# Patient Record
Sex: FEMALE | Race: WHITE | NOT HISPANIC OR LATINO | Employment: UNEMPLOYED | URBAN - NONMETROPOLITAN AREA
[De-identification: names, ages, dates, MRNs, and addresses within clinical notes are randomized per-mention and may not be internally consistent; named-entity substitution may affect disease eponyms.]

---

## 2017-01-04 ENCOUNTER — APPOINTMENT (OUTPATIENT)
Dept: PREADMISSION TESTING | Facility: HOSPITAL | Age: 52
End: 2017-01-04

## 2017-01-04 LAB
ALBUMIN SERPL-MCNC: 4.2 G/DL (ref 3.5–5)
ALBUMIN/GLOB SERPL: 1.3 G/DL (ref 1.5–2.5)
ALP SERPL-CCNC: 61 U/L (ref 46–116)
ALT SERPL W P-5'-P-CCNC: 14 U/L (ref 10–36)
ANION GAP SERPL CALCULATED.3IONS-SCNC: 5.9 MMOL/L (ref 3.6–11.2)
AST SERPL-CCNC: 23 U/L (ref 10–30)
BASOPHILS # BLD AUTO: 0.04 10*3/MM3 (ref 0–0.3)
BASOPHILS NFR BLD AUTO: 0.4 % (ref 0–2)
BILIRUB SERPL-MCNC: 0.4 MG/DL (ref 0.2–1.8)
BUN BLD-MCNC: 21 MG/DL (ref 7–21)
BUN/CREAT SERPL: 23.1 (ref 7–25)
CALCIUM SPEC-SCNC: 9.8 MG/DL (ref 7.7–10)
CHLORIDE SERPL-SCNC: 107 MMOL/L (ref 99–112)
CO2 SERPL-SCNC: 29.1 MMOL/L (ref 24.3–31.9)
CREAT BLD-MCNC: 0.91 MG/DL (ref 0.43–1.29)
DEPRECATED RDW RBC AUTO: 41.1 FL (ref 37–54)
EOSINOPHIL # BLD AUTO: 0.36 10*3/MM3 (ref 0–0.7)
EOSINOPHIL NFR BLD AUTO: 3.8 % (ref 0–5)
ERYTHROCYTE [DISTWIDTH] IN BLOOD BY AUTOMATED COUNT: 13.2 % (ref 11.5–14.5)
GFR SERPL CREATININE-BSD FRML MDRD: 65 ML/MIN/1.73
GLOBULIN UR ELPH-MCNC: 3.2 GM/DL
GLUCOSE BLD-MCNC: 99 MG/DL (ref 70–110)
HCT VFR BLD AUTO: 40.6 % (ref 37–47)
HGB BLD-MCNC: 13.1 G/DL (ref 12–16)
IMM GRANULOCYTES # BLD: 0.02 10*3/MM3 (ref 0–0.03)
IMM GRANULOCYTES NFR BLD: 0.2 % (ref 0–0.5)
LYMPHOCYTES # BLD AUTO: 3.17 10*3/MM3 (ref 1–3)
LYMPHOCYTES NFR BLD AUTO: 33.8 % (ref 21–51)
MCH RBC QN AUTO: 28.1 PG (ref 27–33)
MCHC RBC AUTO-ENTMCNC: 32.3 G/DL (ref 33–37)
MCV RBC AUTO: 86.9 FL (ref 80–94)
MONOCYTES # BLD AUTO: 0.49 10*3/MM3 (ref 0.1–0.9)
MONOCYTES NFR BLD AUTO: 5.2 % (ref 0–10)
NEUTROPHILS # BLD AUTO: 5.3 10*3/MM3 (ref 1.4–6.5)
NEUTROPHILS NFR BLD AUTO: 56.6 % (ref 30–70)
OSMOLALITY SERPL CALC.SUM OF ELEC: 286.1 MOSM/KG (ref 273–305)
PLATELET # BLD AUTO: 339 10*3/MM3 (ref 130–400)
PMV BLD AUTO: 9.9 FL (ref 6–10)
POTASSIUM BLD-SCNC: 3.9 MMOL/L (ref 3.5–5.3)
PROT SERPL-MCNC: 7.4 G/DL (ref 6–8)
RBC # BLD AUTO: 4.67 10*6/MM3 (ref 4.2–5.4)
SODIUM BLD-SCNC: 142 MMOL/L (ref 135–153)
WBC NRBC COR # BLD: 9.38 10*3/MM3 (ref 4.5–12.5)

## 2017-01-04 PROCEDURE — 80053 COMPREHEN METABOLIC PANEL: CPT | Performed by: SURGERY

## 2017-01-04 PROCEDURE — 36415 COLL VENOUS BLD VENIPUNCTURE: CPT

## 2017-01-04 PROCEDURE — 85025 COMPLETE CBC W/AUTO DIFF WBC: CPT | Performed by: SURGERY

## 2017-01-04 NOTE — DISCHARGE INSTRUCTIONS
1/6/17@ 0700  TAKE the following medications the morning of surgery:  All heart or blood pressure medications    HOLD all diabetic medications the morning of surgery as ordered by physician.      General Instructions:  · Do not eat or drink after midnight: includes water, mints, or gum. You may brush your teeth.  · Do not smoke, chew tobacco, or drink alcohol.  · Bring medications in original bottles, any inhalers and if applicable your C-PAP/BI-PAP machine.  · Bring any papers given to you in the doctor's office.  · Wear clean comfortable clothes and socks.  · Do not wear contact lenses or make-up. Bring a case for your glasses if applicable.  · Bring crutches or walker if applicable.  · Leave all other valuables and jewelry at home.    If you were given a blood bank ID arm band remember to bring it with you the day of surgery.    Preventing a Surgical Site Infection:  Shower on the morning of surgery using a fresh bar of anti-bacterial soap (such as Dial) and clean washcloth. Dry with a clean towel and dress in clean clothing.  For 2 to 3 days before surgery, avoid shaving with a razor near where you will have surgery because the razor can irritate skin and make it easier to develop an infection. Ask your surgeon if you will be receiving antibiotics prior to surgery.  Make sure you, your family, and all healthcare providers clear their hands with soap and water or an alcohol-based hand  before caring for you or your wound.  If at all possible, quit smoking as many days before surgery as you can.    Day of surgery:  Upon arrival, a Pre-op nurse and Anesthesiologist will review your health history, obtain vital signs, and answer questions you may have. The only belongings needed at this time will be your home medications and if applicable your C-PAP/BI-PAP machine. If you are staying overnight your family can leave the rest of your belongings in the car and bring them to your room later. A Pre-op nurse will  start an IV and you may receive medication in preparation for surgery, including something to help you relax. Your family will be able to see you in the Pre-op area. While you are in surgery your family should notify the waiting room  if they leave the waiting room area and provide a contact phone number.    Please be aware that surgery does come with discomfort. We want to make every effort to control your discomfort so please discuss any uncontrolled symptoms with your nurse. Your doctor will most likely have prescribed pain medications.    If you are going home after surgery you will receive individualized written care instructions before being discharged. A responsible adult must drive you to and from the hospital on the day of surgery and stay with you for 24 hours.    If you are staying overnight following surgery, you will be transported to your hospital room following the recovery period.  [facility] has all private rooms.    If you have any questions please call Pre-Admission Testing at [phone].  Deductibles and co-payments are collected on the day of service. Please be prepared to pay the required co-pay, deductible or deposit on the day of service as defined by your plan.

## 2017-01-04 NOTE — MR AVS SNAPSHOT
Carine Franklin   1/4/2017 1:45 PM   Appointment    Provider:  PAT 3 COR   [facility]   Dept Phone:  [phone]                Your Full Care Plan              Your Updated Medication List          This list is accurate as of: 1/4/17  1:54 PM.  Always use your most recent med list.                lisinopril 20 MG tablet   Commonly known as:  PRINIVIL,ZESTRIL       PREVACID 30 MG capsule   Generic drug:  lansoprazole               MyChart Signup     Our records indicate that you have declined [facility]. If you would like to sign up for [employer], please email [email or web address] or call [phone] to obtain an activation code.             Other Info from Your Visit           Allergies     No Known Allergies      Vital Signs     Smoking Status                   Former Smoker             Discharge Instructions       1/6/17@ 0700  TAKE the following medications the morning of surgery:  All heart or blood pressure medications    HOLD all diabetic medications the morning of surgery as ordered by physician.      General Instructions:  · Do not eat or drink after midnight: includes water, mints, or gum. You may brush your teeth.  · Do not smoke, chew tobacco, or drink alcohol.  · Bring medications in original bottles, any inhalers and if applicable your C-PAP/BI-PAP machine.  · Bring any papers given to you in the doctor's office.  · Wear clean comfortable clothes and socks.  · Do not wear contact lenses or make-up. Bring a case for your glasses if applicable.  · Bring crutches or walker if applicable.  · Leave all other valuables and jewelry at home.    If you were given a blood bank ID arm band remember to bring it with you the day of surgery.    Preventing a Surgical Site Infection:  Shower on the morning of surgery using a fresh bar of anti-bacterial soap (such as Dial) and clean washcloth. Dry with a clean towel and  dress in clean clothing.  For 2 to 3 days before surgery, avoid shaving with a razor near where you will have surgery because the razor can irritate skin and make it easier to develop an infection. Ask your surgeon if you will be receiving antibiotics prior to surgery.  Make sure you, your family, and all healthcare providers clear their hands with soap and water or an alcohol-based hand  before caring for you or your wound.  If at all possible, quit smoking as many days before surgery as you can.    Day of surgery:  Upon arrival, a Pre-op nurse and Anesthesiologist will review your health history, obtain vital signs, and answer questions you may have. The only belongings needed at this time will be your home medications and if applicable your C-PAP/BI-PAP machine. If you are staying overnight your family can leave the rest of your belongings in the car and bring them to your room later. A Pre-op nurse will start an IV and you may receive medication in preparation for surgery, including something to help you relax. Your family will be able to see you in the Pre-op area. While you are in surgery your family should notify the waiting room  if they leave the waiting room area and provide a contact phone number.    Please be aware that surgery does come with discomfort. We want to make every effort to control your discomfort so please discuss any uncontrolled symptoms with your nurse. Your doctor will most likely have prescribed pain medications.    If you are going home after surgery you will receive individualized written care instructions before being discharged. A responsible adult must drive you to and from the hospital on the day of surgery and stay with you for 24 hours.    If you are staying overnight following surgery, you will be transported to your hospital room following the recovery period.  [facility] has all private rooms.    If you have any questions please call  Pre-Admission Testing at [phone].  Deductibles and co-payments are collected on the day of service. Please be prepared to pay the required co-pay, deductible or deposit on the day of service as defined by your plan.           SYMPTOMS OF A STROKE    Call 911 or have someone take you to the Emergency Department if you have any of the following:    · Sudden numbness or weakness of your face, arm or leg especially on one side of the body  · Sudden confusion, diffiiculty speaking or trouble understanding   · Changes in your vision or loss of sight in one eye  · Sudden severe headache with no known cause  · sudden dizziness, trouble walking, loss of balance or coordination    It is important to seek emergency care right away if you have further stroke symptoms. If you get emergency help quickly, the powerful clot-dissolving medicines can reduce the disabilities caused by a stroke.     For more information:    American Stroke Association  1-158-7-STROKE  www.strokeassociation.org           IF YOU SMOKE OR USE TOBACCO PLEASE READ THE FOLLOWING:    Why is smoking bad for me?  Smoking increases the risk of heart disease, lung disease, vascular disease, stroke, and cancer.     If you smoke, STOP!    If you would like more information on quitting smoking, please visit the [employer] website: [email or web address]   This link will provide additional resources including the QUIT line and the [employer] support groups.     For more information:    American Cancer Society  ([phone] 832-7847    American Heart Association  9-307-734-4362

## 2017-01-20 ENCOUNTER — TELEPHONE (OUTPATIENT)
Dept: SURGERY | Facility: CLINIC | Age: 52
End: 2017-01-20

## 2017-01-20 ENCOUNTER — HOSPITAL ENCOUNTER (EMERGENCY)
Facility: HOSPITAL | Age: 52
Discharge: HOME | End: 2017-01-20
Attending: FAMILY MEDICINE | Admitting: FAMILY MEDICINE

## 2017-01-20 ENCOUNTER — ANESTHESIA (OUTPATIENT)
Dept: PERIOP | Facility: HOSPITAL | Age: 52
End: 2017-01-20

## 2017-01-20 ENCOUNTER — ANESTHESIA EVENT (OUTPATIENT)
Dept: PERIOP | Facility: HOSPITAL | Age: 52
End: 2017-01-20

## 2017-01-20 VITALS
HEIGHT: 66 IN | DIASTOLIC BLOOD PRESSURE: 76 MMHG | TEMPERATURE: 98.4 F | OXYGEN SATURATION: 100 % | BODY MASS INDEX: 40.34 KG/M2 | WEIGHT: 251 LBS | HEART RATE: 91 BPM | RESPIRATION RATE: 18 BRPM | SYSTOLIC BLOOD PRESSURE: 124 MMHG

## 2017-01-20 DIAGNOSIS — Z48.89 ENCOUNTER FOR POST SURGICAL WOUND CHECK: Primary | ICD-10-CM

## 2017-01-20 PROCEDURE — 99283 EMERGENCY DEPT VISIT LOW MDM: CPT

## 2017-01-20 PROCEDURE — 25010000002 MIDAZOLAM PER 1 MG: Performed by: NURSE ANESTHETIST, CERTIFIED REGISTERED

## 2017-01-20 PROCEDURE — 25010000002 DEXAMETHASONE PER 1 MG: Performed by: NURSE ANESTHETIST, CERTIFIED REGISTERED

## 2017-01-20 PROCEDURE — 25010000002 KETOROLAC TROMETHAMINE PER 15 MG: Performed by: NURSE ANESTHETIST, CERTIFIED REGISTERED

## 2017-01-20 PROCEDURE — 25010000002 PROPOFOL 10 MG/ML EMULSION: Performed by: NURSE ANESTHETIST, CERTIFIED REGISTERED

## 2017-01-20 PROCEDURE — 25010000002 ONDANSETRON PER 1 MG: Performed by: NURSE ANESTHETIST, CERTIFIED REGISTERED

## 2017-01-20 PROCEDURE — 25010000002 FENTANYL CITRATE (PF) 100 MCG/2ML SOLUTION: Performed by: NURSE ANESTHETIST, CERTIFIED REGISTERED

## 2017-01-20 RX ORDER — KETOROLAC TROMETHAMINE 30 MG/ML
INJECTION, SOLUTION INTRAMUSCULAR; INTRAVENOUS AS NEEDED
Status: DISCONTINUED | OUTPATIENT
Start: 2017-01-20 | End: 2017-01-20 | Stop reason: SURG

## 2017-01-20 RX ORDER — FAMOTIDINE 10 MG/ML
INJECTION, SOLUTION INTRAVENOUS AS NEEDED
Status: DISCONTINUED | OUTPATIENT
Start: 2017-01-20 | End: 2017-01-20 | Stop reason: SURG

## 2017-01-20 RX ORDER — MIDAZOLAM HYDROCHLORIDE 1 MG/ML
INJECTION INTRAMUSCULAR; INTRAVENOUS AS NEEDED
Status: DISCONTINUED | OUTPATIENT
Start: 2017-01-20 | End: 2017-01-20 | Stop reason: SURG

## 2017-01-20 RX ORDER — LIDOCAINE HYDROCHLORIDE 20 MG/ML
INJECTION, SOLUTION INFILTRATION; PERINEURAL AS NEEDED
Status: DISCONTINUED | OUTPATIENT
Start: 2017-01-20 | End: 2017-01-20 | Stop reason: SURG

## 2017-01-20 RX ORDER — FENTANYL CITRATE 50 UG/ML
INJECTION, SOLUTION INTRAMUSCULAR; INTRAVENOUS AS NEEDED
Status: DISCONTINUED | OUTPATIENT
Start: 2017-01-20 | End: 2017-01-20 | Stop reason: SURG

## 2017-01-20 RX ORDER — ROCURONIUM BROMIDE 10 MG/ML
INJECTION, SOLUTION INTRAVENOUS AS NEEDED
Status: DISCONTINUED | OUTPATIENT
Start: 2017-01-20 | End: 2017-01-20 | Stop reason: SURG

## 2017-01-20 RX ORDER — PROPOFOL 10 MG/ML
VIAL (ML) INTRAVENOUS AS NEEDED
Status: DISCONTINUED | OUTPATIENT
Start: 2017-01-20 | End: 2017-01-20 | Stop reason: SURG

## 2017-01-20 RX ORDER — DEXAMETHASONE SODIUM PHOSPHATE 10 MG/ML
INJECTION INTRAMUSCULAR; INTRAVENOUS AS NEEDED
Status: DISCONTINUED | OUTPATIENT
Start: 2017-01-20 | End: 2017-01-20 | Stop reason: SURG

## 2017-01-20 RX ORDER — ONDANSETRON 2 MG/ML
INJECTION INTRAMUSCULAR; INTRAVENOUS AS NEEDED
Status: DISCONTINUED | OUTPATIENT
Start: 2017-01-20 | End: 2017-01-20 | Stop reason: SURG

## 2017-01-20 RX ADMIN — MIDAZOLAM HYDROCHLORIDE 2 MG: 1 INJECTION, SOLUTION INTRAMUSCULAR; INTRAVENOUS at 08:41

## 2017-01-20 RX ADMIN — AMPICILLIN SODIUM AND SULBACTAM SODIUM 3 G: 2; 1 INJECTION, POWDER, FOR SOLUTION INTRAMUSCULAR; INTRAVENOUS at 08:41

## 2017-01-20 RX ADMIN — ROCURONIUM BROMIDE 20 MG: 10 INJECTION INTRAVENOUS at 08:47

## 2017-01-20 RX ADMIN — FAMOTIDINE 20 MG: 10 INJECTION, SOLUTION INTRAVENOUS at 08:41

## 2017-01-20 RX ADMIN — ONDANSETRON 4 MG: 2 INJECTION, SOLUTION INTRAMUSCULAR; INTRAVENOUS at 08:41

## 2017-01-20 RX ADMIN — KETOROLAC TROMETHAMINE 30 MG: 30 INJECTION, SOLUTION INTRAMUSCULAR; INTRAVENOUS at 09:15

## 2017-01-20 RX ADMIN — LIDOCAINE HYDROCHLORIDE 100 MG: 20 INJECTION, SOLUTION INFILTRATION; PERINEURAL at 08:47

## 2017-01-20 RX ADMIN — DEXAMETHASONE SODIUM PHOSPHATE 4 MG: 10 INJECTION INTRAMUSCULAR; INTRAVENOUS at 08:41

## 2017-01-20 RX ADMIN — SODIUM CHLORIDE, POTASSIUM CHLORIDE, SODIUM LACTATE AND CALCIUM CHLORIDE: 600; 310; 30; 20 INJECTION, SOLUTION INTRAVENOUS at 08:41

## 2017-01-20 RX ADMIN — FENTANYL CITRATE 100 MCG: 50 INJECTION INTRAMUSCULAR; INTRAVENOUS at 08:47

## 2017-01-20 RX ADMIN — PROPOFOL 200 MG: 10 INJECTION, EMULSION INTRAVENOUS at 08:47

## 2017-01-20 NOTE — ANESTHESIA POSTPROCEDURE EVALUATION
Patient: Carine Franklin    Procedure Summary     Date Anesthesia Start Anesthesia Stop Room / Location    01/20/17 0842 0933  [facility] OR 02 / [facility] OR       Procedure Diagnosis Surgeon Provider    CHOLECYSTECTOMY LAPAROSCOPIC (N/A Abdomen) Gallstones  (Gallstones [K80.20]) MD Nori Barron CRNA          Anesthesia Type: general  Last vitals  /74 (01/20/17 1011)    Temp 97.8 °F (36.6 °C) (01/20/17 1011)    Pulse 58 (01/20/17 1011)   Resp 16 (01/20/17 1011)    SpO2 98 % (01/20/17 1011)      Post Anesthesia Care and Evaluation    Patient location during evaluation: PHASE II  Patient participation: complete - patient participated  Level of consciousness: awake and alert  Pain score: 1  Pain management: adequate  Airway patency: patent  Anesthetic complications: No anesthetic complications  PONV Status: controlled  Cardiovascular status: acceptable  Respiratory status: acceptable  Hydration status: acceptable

## 2017-01-20 NOTE — ANESTHESIA PREPROCEDURE EVALUATION
Anesthesia Evaluation     Patient summary reviewed and Nursing notes reviewed    No history of anesthetic complications   Airway   Mallampati: II  TM distance: >3 FB  Neck ROM: full  no difficulty expected  Dental    (+) upper dentures and lower dentures    Pulmonary - negative pulmonary ROS and normal exam    breath sounds clear to auscultation  Cardiovascular - normal exam  (+) hypertension,     Rhythm: regular  Rate: normal    Neuro/Psych- negative ROS  GI/Hepatic/Renal/Endo    (+) obesity, morbid obesity (BMI 40kg/m2), GERD well controlled,     Musculoskeletal (-) negative ROS    Abdominal   (+) obese,     Bowel sounds: normal.   Substance History - negative use     OB/GYN negative ob/gyn ROS         Other                         Anesthesia Plan    ASA 3     general     intravenous induction   Anesthetic plan and risks discussed with patient.    Plan discussed with CRNA.

## 2017-01-20 NOTE — ANESTHESIA PROCEDURE NOTES
Airway  Airway not difficult    General Information and Staff    Patient location during procedure: OR  CRNA: [facility]    Indications and Patient Condition  Indications for airway management: airway protection    Preoxygenated: no  MILS maintained throughout  Mask difficulty assessment: 0 - not attempted    Final Airway Details  Final airway type: endotracheal airway      Successful airway: ETT  Cuffed: yes   Successful intubation technique: direct laryngoscopy  Endotracheal tube insertion site: oral  Blade: Cifuentes  Blade size: #2  ETT size: 7.5 mm  Cormack-Lehane Classification: grade I - full view of glottis  Placement verified by: chest auscultation and capnometry   Measured from: gums  ETT to gums (cm): 20  Number of attempts at approach: 1

## 2017-01-20 NOTE — ADDENDUM NOTE
Addendum  created 01/20/17 1054 by Gaurang Bush, DO    Anesthesia Attestations filed, Anesthesia Review and Sign - Ready for Procedure, Anesthesia Staff edited, Order sets accessed, Problem List reviewed, Sign clinical note

## 2017-01-20 NOTE — TELEPHONE ENCOUNTER
Patients daughter called and stated that the patient had gallbladder surgery this morning and when she stood up she started gushing blood around her navel, so I did speak with Dr. Mendez and he said for her to hold 2 fingers and apply pressure for 20 minutes and if that doesn't work then she needs to do it again for another 20 minutes and if it is still draining then she will need to go to the ER and she understood and agreed to do that.

## 2017-01-21 NOTE — ED PROVIDER NOTES
Subjective   Patient is a 52 y.o. female presenting with wound check.   History provided by:  Patient   used: No    Wound Check   Location:  Umbilical  Severity:  Mild  Onset quality:  Gradual  Timing:  Intermittent  Progression:  Waxing and waning  Chronicity:  New  Context:  Had laparoscopic cholecystectomy this morning, has bleeding from umbilical wound  Worsened by:  Movement  Ineffective treatments:  Pressure  Associated symptoms: no abdominal pain, no chest pain, no congestion, no fatigue, no fever, no headaches, no rash, no rhinorrhea, no shortness of breath and no sore throat        Review of Systems   Constitutional: Negative.  Negative for fatigue and fever.   HENT: Negative.  Negative for congestion, rhinorrhea and sore throat.    Respiratory: Negative.  Negative for shortness of breath.    Cardiovascular: Negative.  Negative for chest pain.   Gastrointestinal: Negative.  Negative for abdominal pain.   Endocrine: Negative.    Genitourinary: Negative.  Negative for dysuria.   Skin: Positive for wound. Negative for rash.   Neurological: Negative.  Negative for headaches.   Psychiatric/Behavioral: Negative.    All other systems reviewed and are negative.      Past Medical History   Diagnosis Date   • Acid reflux    • Gallstones    • Hypertension        Allergies   Allergen Reactions   • No Known Drug Allergy        Past Surgical History   Procedure Laterality Date   • Laparoscopic tubal ligation     • Laser ablation         Family History   Problem Relation Age of Onset   • Hypertension Mother    • Cancer Father    • Diabetes Sister    • Hypertension Sister    • Hypertension Brother    • Diabetes Sister    • Hypertension Sister    • Hypertension Brother    • Hypertension Sister    • Heart disease Neg Hx        Social History     Social History   • Marital status:      Spouse name: N/A   • Number of children: N/A   • Years of education: N/A     Social History Main Topics   • Smoking  status: Former Smoker     Years: 15.00   • Smokeless tobacco: Not on file      Comment: quit 26 years ago   • Alcohol use No   • Drug use: No   • Sexual activity: Defer     Other Topics Concern   • Not on file     Social History Narrative           Objective   Physical Exam   Constitutional: She is oriented to person, place, and time. She appears well-developed and well-nourished.   HENT:   Head: Normocephalic and atraumatic.   Nose: Nose normal.   Mouth/Throat: Oropharynx is clear and moist.   Eyes: EOM are normal. Pupils are equal, round, and reactive to light.   Neck: Normal range of motion.   Cardiovascular: Normal rate, regular rhythm, normal heart sounds and intact distal pulses.    Pulmonary/Chest: Effort normal and breath sounds normal.   Abdominal: Soft. Bowel sounds are normal.   Musculoskeletal: Normal range of motion.   Neurological: She is alert and oriented to person, place, and time.   Skin: Skin is warm and dry.   4 tiny surgical wounds on abdomen, 3 closed with very mild surrounding ecchymosis. Umbilical surgical wound <0.5 cm opening with mild bleeding.    Psychiatric: She has a normal mood and affect. Her behavior is normal. Judgment and thought content normal.   Nursing note and vitals reviewed.      Procedures         ED Course  ED Course   Comment By Time   Applied skin affix to umbilical wound and applied small piece of surgi-jorge l and applied dressing. Bleeding controlled now.  Sara Archer, NASRIN 01/20 2149                  [facility]  Number of Diagnoses or Management Options  Encounter for post surgical wound check: new and does not require workup  Risk of Complications, Morbidity, and/or Mortality  Presenting problems: moderate  Diagnostic procedures: moderate  Management options: moderate    Patient Progress  Patient progress: improved      Final diagnoses:   None            NASRIN Borden  01/20/17 2220

## 2017-01-31 ENCOUNTER — OFFICE VISIT (OUTPATIENT)
Dept: SURGERY | Facility: CLINIC | Age: 52
End: 2017-01-31

## 2017-01-31 VITALS
DIASTOLIC BLOOD PRESSURE: 88 MMHG | WEIGHT: 251 LBS | BODY MASS INDEX: 40.34 KG/M2 | HEIGHT: 66 IN | HEART RATE: 67 BPM | SYSTOLIC BLOOD PRESSURE: 123 MMHG

## 2017-01-31 DIAGNOSIS — K80.20 GALLSTONES: Primary | ICD-10-CM

## 2017-01-31 PROCEDURE — 99024 POSTOP FOLLOW-UP VISIT: CPT | Performed by: SURGERY

## 2017-01-31 NOTE — PROGRESS NOTES
Subjective   Carine Franklin is a 52 y.o. female  is here today for follow-up.         Carine Franklin is a 52 y.o. female here for followup after cholecystectomy.  The patient is doing well and tolerating diet.  Their incisions are healing well at this time.  She did return to the emergency department for some bleeding from the infraumbilical incision which was controlled with pressure.  No complaints reported.              Carine was seen today for post-op lap yasmine.    Diagnoses and all orders for this visit:    Gallstones        Assessment     52-year-old female doing well after cholecystectomy.  Pathology consistent with cholelithiasis and cholecystitis.  She will follow-up as needed.

## 2017-01-31 NOTE — MR AVS SNAPSHOT
"                        Carine Franklin   1/31/2017 9:30 AM   Office Visit    Dept Phone:  [phone]   Encounter #:  [id number]    Provider:  Manoj Mendez MD   Department:  [facility] GENERAL SURGERY                Your Full Care Plan              Your Updated Medication List          This list is accurate as of: 1/31/17 10:23 AM.  Always use your most recent med list.                HYDROcodone-acetaminophen 5-325 MG per tablet   Commonly known as:  NORCO   Take 1-2 tablets by mouth Every 4 (Four) Hours As Needed (Pain).       lisinopril 20 MG tablet   Commonly known as:  PRINIVIL,ZESTRIL       PREVACID 30 MG capsule   Generic drug:  lansoprazole               You Were Diagnosed With        Codes Comments    Gallstones    -  Primary ICD-10-CM: K80.20  ICD-9-CM: 574.20       Instructions     None    Patient Instructions History      Upcoming Appointments     Visit Type Date Time Department    POST-OP 1/31/2017  9:30 AM [facility]      MyChart Signup     Our records [facility] that [facility]. If you would like to sign up for SmartCare systemt, please email [email or web address] or call [phone] to obtain an activation code.             Other Info from Your Visit           Allergies     No Known Drug Allergy        Reason for Visit     POST-OP LAP ADALBERTO           Vital Signs     Blood Pressure Pulse Height Weight Body Mass Index Smoking Status    123/88 67 66\" (167.6 cm) 251 lb (114 kg) 40.51 kg/m2 Former Smoker      Problems and Diagnoses Noted     Gallstones        "

## 2017-01-31 NOTE — LETTER
January 31, 2017     Fly Pereyra MD  [address]  [facility] [address]    Patient: Carine Franklin   YOB: 1965   Date of Visit: 1/31/2017       Dear Dr. Roddy MD:    Thank you for referring Carine Franklin to me for evaluation. Below are the relevant portions of my assessment and plan of care.           52-year-old female doing well after cholecystectomy.  Pathology consistent with cholelithiasis and cholecystitis.  She will follow-up as needed.               If you have questions, please do not hesitate to call me. I look forward to following Carine along with you.         Sincerely,        Manoj Mendez MD        CC: No Recipients

## 2017-08-08 ENCOUNTER — OFFICE VISIT (OUTPATIENT)
Dept: FAMILY MEDICINE CLINIC | Facility: CLINIC | Age: 52
End: 2017-08-08

## 2017-08-08 VITALS
WEIGHT: 255 LBS | HEART RATE: 75 BPM | TEMPERATURE: 98.8 F | HEIGHT: 66 IN | DIASTOLIC BLOOD PRESSURE: 90 MMHG | SYSTOLIC BLOOD PRESSURE: 130 MMHG | BODY MASS INDEX: 40.98 KG/M2 | OXYGEN SATURATION: 97 %

## 2017-08-08 DIAGNOSIS — I10 ESSENTIAL HYPERTENSION: ICD-10-CM

## 2017-08-08 DIAGNOSIS — K21.9 GASTROESOPHAGEAL REFLUX DISEASE WITHOUT ESOPHAGITIS: ICD-10-CM

## 2017-08-08 DIAGNOSIS — M15.9 OSTEOARTHRITIS INVOLVING MULTIPLE JOINTS ON BOTH SIDES OF BODY: ICD-10-CM

## 2017-08-08 DIAGNOSIS — M15.9 OSTEOARTHRITIS OF MULTIPLE JOINTS, UNSPECIFIED OSTEOARTHRITIS TYPE: ICD-10-CM

## 2017-08-08 DIAGNOSIS — Z13.9 SCREENING: ICD-10-CM

## 2017-08-08 DIAGNOSIS — E66.01 MORBID OBESITY, UNSPECIFIED OBESITY TYPE (HCC): Primary | ICD-10-CM

## 2017-08-08 PROCEDURE — 99204 OFFICE O/P NEW MOD 45 MIN: CPT | Performed by: NURSE PRACTITIONER

## 2017-08-08 RX ORDER — PANTOPRAZOLE SODIUM 40 MG/1
40 TABLET, DELAYED RELEASE ORAL DAILY
COMMUNITY
End: 2017-08-08 | Stop reason: SDUPTHER

## 2017-08-08 RX ORDER — MELOXICAM 15 MG/1
15 TABLET ORAL DAILY
Qty: 30 TABLET | Refills: 5 | Status: SHIPPED | OUTPATIENT
Start: 2017-08-08 | End: 2018-02-24 | Stop reason: HOSPADM

## 2017-08-08 RX ORDER — HYDROCHLOROTHIAZIDE 12.5 MG/1
12.5 TABLET ORAL DAILY
COMMUNITY
End: 2017-08-08 | Stop reason: SDUPTHER

## 2017-08-08 RX ORDER — PANTOPRAZOLE SODIUM 40 MG/1
40 TABLET, DELAYED RELEASE ORAL DAILY
Qty: 30 TABLET | Refills: 5 | Status: SHIPPED | OUTPATIENT
Start: 2017-08-08 | End: 2017-09-07 | Stop reason: SINTOL

## 2017-08-08 RX ORDER — HYDROCHLOROTHIAZIDE 12.5 MG/1
12.5 TABLET ORAL DAILY
Qty: 30 TABLET | Refills: 5 | Status: SHIPPED | OUTPATIENT
Start: 2017-08-08 | End: 2017-08-14 | Stop reason: DRUGHIGH

## 2017-08-08 RX ORDER — MELOXICAM 7.5 MG/1
7.5 TABLET ORAL DAILY
COMMUNITY
End: 2017-08-08 | Stop reason: DRUGHIGH

## 2017-08-08 NOTE — PROGRESS NOTES
Subjective   Carine Franklin is a 52 y.o. female.     Chief Complaint   Patient presents with   • Establish care       History of Present Illness     Patient is here today to establish care she is a former patient of [facility].  Hypertension - patient checks her blood pressure randomly at home with readings reported within acceptable range.  She currently takes hydrochlorothiazide daily.  Gastroesophageal reflux disease - patient had an EGD approximately 2 years ago with findings of gastritis.  She is well controlled on her current dose of Protonix 40 mg 1 daily.  Osteoarthritis - patient has a history of osteoarthritis in her low back, knees, neck and shoulders.  She reports this pain is stiff and aching.  Pain is worse with increased activity.  She currently is controlled on Mobic 7.5 mg twice daily.  Her insurance will not cover this strength and she is requesting a change.  She denies any side effects.   Obesity - patient has struggled with obesity most of her adult life.  She recently lost approximately 60 pounds and was planning on having weight loss surgery.  During that time her mother became ill and passed away and Carine got off track on her diet and weight loss goals.  She has been walking and trying to eat a low carb heart healthy diet.  She would like to discuss options.    Family history of type 2 diabetes - she has 2 sisters with type 2 diabetes.      The following portions of the patient's history were reviewed and updated as appropriate: allergies, current medications, past family history, past medical history, past social history, past surgical history and problem list.    Review of Systems   Constitutional: Negative for activity change, appetite change, chills, fatigue and fever.   HENT: Negative for ear pain, facial swelling, hearing loss, sinus pressure, sore throat, trouble swallowing and voice change.    Eyes: Negative for pain, discharge and visual disturbance.   Respiratory:  "Negative for apnea, cough, chest tightness, shortness of breath and wheezing.    Cardiovascular: Negative for chest pain, palpitations and leg swelling.   Gastrointestinal: Negative for abdominal pain, blood in stool, constipation, diarrhea, nausea and vomiting.   Genitourinary: Negative for dysuria.   Musculoskeletal: Positive for arthralgias, back pain, neck pain and neck stiffness.   Skin: Negative for color change.   Neurological: Negative for headaches.   Psychiatric/Behavioral: Negative for confusion and suicidal ideas. The patient is not nervous/anxious.    All other systems reviewed and are negative.      Objective     /90 (BP Location: Left arm, Patient Position: Sitting, Cuff Size: Adult)  Pulse 75  Temp 98.8 °F (37.1 °C) (Tympanic)   Ht 66\" (167.6 cm)  Wt 255 lb (116 kg)  SpO2 97%  BMI 41.16 kg/m2      Physical Exam   Constitutional: She is oriented to person, place, and time. Vital signs are normal. She appears well-developed and well-nourished. No distress.   Visibly obese.    HENT:   Head: Normocephalic and atraumatic.   Right Ear: External ear normal.   Left Ear: External ear normal.   Nose: Nose normal.   Mouth/Throat: Oropharynx is clear and moist. No oropharyngeal exudate.   Eyes: EOM are normal. Pupils are equal, round, and reactive to light.   Neck: Normal range of motion. Neck supple. No tracheal deviation present. No thyromegaly present.   Cardiovascular: Normal rate, regular rhythm, normal heart sounds and intact distal pulses.  Exam reveals no gallop and no friction rub.    No murmur heard.  Pulmonary/Chest: Effort normal and breath sounds normal. No respiratory distress. She has no wheezes. She has no rales. She exhibits no tenderness.   Abdominal: Soft. Bowel sounds are normal. She exhibits no distension and no mass. There is no tenderness. There is no rebound and no guarding.   Musculoskeletal:        Right knee: She exhibits decreased range of motion.        Left knee: She " exhibits decreased range of motion.        Cervical back: She exhibits tenderness, pain and spasm.   General stiffness is noted in both shoulders.   Lymphadenopathy:     She has no cervical adenopathy.   Neurological: She is alert and oriented to person, place, and time. She has normal reflexes.   CN 2-12 grossly intact    Skin: Skin is warm and dry. No rash noted. She is not diaphoretic. No erythema.   Psychiatric: She has a normal mood and affect. Her speech is normal and behavior is normal. Judgment and thought content normal. Cognition and memory are normal.   Vitals reviewed.      Assessment/Plan     Problem List Items Addressed This Visit        Cardiovascular and Mediastinum    Essential hypertension    Relevant Medications    hydrochlorothiazide (HYDRODIURIL) 12.5 MG tablet    Other Relevant Orders    CBC & Differential    Comprehensive Metabolic Panel    TSH    Hemoglobin A1c    Vitamin D 25 Hydroxy    MicroAlbumin, Urine, Random    Lipid Panel    Vitamin B12       Digestive    Morbid obesity - Primary    Relevant Orders    Ambulatory Referral to Bariatric Surgery    Gastroesophageal reflux disease without esophagitis    Relevant Medications    pantoprazole (PROTONIX) 40 MG EC tablet       Musculoskeletal and Integument    Osteoarthritis of multiple joints      Other Visit Diagnoses     Osteoarthritis involving multiple joints on both sides of body        Relevant Orders    XR Spine Cervical 2 or 3 View    XR Shoulder 2+ View Left    XR Knee 1 or 2 View Bilateral    Screening        Relevant Orders    CBC & Differential    Comprehensive Metabolic Panel    TSH    Hemoglobin A1c    Vitamin D 25 Hydroxy    MicroAlbumin, Urine, Random    Lipid Panel    Vitamin B12      GERD precautions have been discussed.  Body mechanics have been reviewed.  Side effects of anti-inflammatory medication has been discussed.  We will increase Mobic to 15 mg daily.  Patient may half and take 7.5 mg twice daily if tolerated.  I  have discussed diagnosis in detail today allowing time for questions and answers. Pt is aware of reasons to seek urgent or emergent medical care as well as reasons to return to the clinic for evaluation. Possible side effects, interactions and progression of symptoms discussed as well. Pt / family states understanding.   We have discussed in detail options for weight loss surgery.  Patient will continue on a low fat heart healthy diet with increased protein and low carb intake.  Daily exercise is recommended such as walking or swimming.  Refill routine medications.  X-ray orders have been provided.  Referral to Dr. Pollock for evaluation.  Patient did have an EGD in 2015 in [place] which will be requested.  Follow-up in one month, sooner if needed.  Fasting labs have been ordered.               This document has been electronically signed by:  NASRIN Can, SONIAC

## 2017-08-14 RX ORDER — LISINOPRIL AND HYDROCHLOROTHIAZIDE 12.5; 1 MG/1; MG/1
TABLET ORAL
Qty: 15 TABLET | Refills: 3
Start: 2017-08-14 | End: 2018-02-24 | Stop reason: HOSPADM

## 2017-08-29 ENCOUNTER — HOSPITAL ENCOUNTER (OUTPATIENT)
Dept: GENERAL RADIOLOGY | Facility: HOSPITAL | Age: 52
Discharge: HOME | End: 2017-08-29
Admitting: NURSE PRACTITIONER

## 2017-08-29 ENCOUNTER — HOSPITAL ENCOUNTER (OUTPATIENT)
Dept: GENERAL RADIOLOGY | Facility: HOSPITAL | Age: 52
Discharge: HOME | End: 2017-08-29

## 2017-08-29 DIAGNOSIS — M77.9 BONE SPUR: ICD-10-CM

## 2017-08-29 DIAGNOSIS — M13.0 ARTHRITIS OF MULTIPLE SITES: Primary | ICD-10-CM

## 2017-08-29 DIAGNOSIS — M15.9 OSTEOARTHRITIS INVOLVING MULTIPLE JOINTS ON BOTH SIDES OF BODY: ICD-10-CM

## 2017-08-29 PROCEDURE — 72050 X-RAY EXAM NECK SPINE 4/5VWS: CPT | Performed by: RADIOLOGY

## 2017-08-29 PROCEDURE — 73560 X-RAY EXAM OF KNEE 1 OR 2: CPT | Performed by: RADIOLOGY

## 2017-08-29 PROCEDURE — 73030 X-RAY EXAM OF SHOULDER: CPT

## 2017-08-29 PROCEDURE — 72040 X-RAY EXAM NECK SPINE 2-3 VW: CPT

## 2017-08-29 PROCEDURE — 73560 X-RAY EXAM OF KNEE 1 OR 2: CPT

## 2017-08-29 PROCEDURE — 73030 X-RAY EXAM OF SHOULDER: CPT | Performed by: RADIOLOGY

## 2017-09-01 ENCOUNTER — TELEPHONE (OUTPATIENT)
Dept: FAMILY MEDICINE CLINIC | Facility: CLINIC | Age: 52
End: 2017-09-01

## 2017-09-01 NOTE — TELEPHONE ENCOUNTER
----- Message from NASRIN Atkins sent at 8/29/2017  1:10 PM EDT -----  Please let patient know that she appears to have a bone spur in her neck that may be causing her pain.  We will need to refer her for further evaluation.

## 2017-09-05 ENCOUNTER — LAB (OUTPATIENT)
Dept: FAMILY MEDICINE CLINIC | Facility: CLINIC | Age: 52
End: 2017-09-05

## 2017-09-05 DIAGNOSIS — Z13.9 SCREENING: ICD-10-CM

## 2017-09-05 DIAGNOSIS — I10 ESSENTIAL HYPERTENSION: ICD-10-CM

## 2017-09-05 LAB
25(OH)D3 SERPL-MCNC: 42 NG/ML
ALBUMIN SERPL-MCNC: 4.1 G/DL (ref 3.5–5)
ALBUMIN UR-MCNC: 2.6 MG/L
ALBUMIN/GLOB SERPL: 1.4 G/DL (ref 1.5–2.5)
ALP SERPL-CCNC: 56 U/L (ref 35–104)
ALT SERPL W P-5'-P-CCNC: 18 U/L (ref 10–36)
ANION GAP SERPL CALCULATED.3IONS-SCNC: 7.6 MMOL/L (ref 3.6–11.2)
AST SERPL-CCNC: 20 U/L (ref 10–30)
BASOPHILS # BLD AUTO: 0.02 10*3/MM3 (ref 0–0.3)
BASOPHILS NFR BLD AUTO: 0.3 % (ref 0–2)
BILIRUB SERPL-MCNC: 0.5 MG/DL (ref 0.2–1.8)
BUN BLD-MCNC: 23 MG/DL (ref 7–21)
BUN/CREAT SERPL: 29.1 (ref 7–25)
CALCIUM SPEC-SCNC: 9.7 MG/DL (ref 7.7–10)
CHLORIDE SERPL-SCNC: 109 MMOL/L (ref 99–112)
CHOLEST SERPL-MCNC: 187 MG/DL (ref 0–200)
CO2 SERPL-SCNC: 24.4 MMOL/L (ref 24.3–31.9)
CREAT BLD-MCNC: 0.79 MG/DL (ref 0.43–1.29)
DEPRECATED RDW RBC AUTO: 37.6 FL (ref 37–54)
EOSINOPHIL # BLD AUTO: 0.17 10*3/MM3 (ref 0–0.7)
EOSINOPHIL NFR BLD AUTO: 2.7 % (ref 0–5)
ERYTHROCYTE [DISTWIDTH] IN BLOOD BY AUTOMATED COUNT: 12.5 % (ref 11.5–14.5)
GFR SERPL CREATININE-BSD FRML MDRD: 76 ML/MIN/1.73
GLOBULIN UR ELPH-MCNC: 3 GM/DL
GLUCOSE BLD-MCNC: 100 MG/DL (ref 70–110)
HBA1C MFR BLD: 5.3 % (ref 4.5–5.7)
HCT VFR BLD AUTO: 38.6 % (ref 37–47)
HDLC SERPL-MCNC: 49 MG/DL (ref 60–100)
HGB BLD-MCNC: 12.5 G/DL (ref 12–16)
IMM GRANULOCYTES # BLD: 0.01 10*3/MM3 (ref 0–0.03)
IMM GRANULOCYTES NFR BLD: 0.2 % (ref 0–0.5)
LDLC SERPL CALC-MCNC: 125 MG/DL (ref 0–100)
LDLC/HDLC SERPL: 2.55 {RATIO}
LYMPHOCYTES # BLD AUTO: 2.33 10*3/MM3 (ref 1–3)
LYMPHOCYTES NFR BLD AUTO: 37.5 % (ref 21–51)
MCH RBC QN AUTO: 27.7 PG (ref 27–33)
MCHC RBC AUTO-ENTMCNC: 32.4 G/DL (ref 33–37)
MCV RBC AUTO: 85.6 FL (ref 80–94)
MONOCYTES # BLD AUTO: 0.52 10*3/MM3 (ref 0.1–0.9)
MONOCYTES NFR BLD AUTO: 8.4 % (ref 0–10)
NEUTROPHILS # BLD AUTO: 3.16 10*3/MM3 (ref 1.4–6.5)
NEUTROPHILS NFR BLD AUTO: 50.9 % (ref 30–70)
OSMOLALITY SERPL CALC.SUM OF ELEC: 285 MOSM/KG (ref 273–305)
PLATELET # BLD AUTO: 331 10*3/MM3 (ref 130–400)
PMV BLD AUTO: 9.9 FL (ref 6–10)
POTASSIUM BLD-SCNC: 4.1 MMOL/L (ref 3.5–5.3)
PROT SERPL-MCNC: 7.1 G/DL (ref 6–8)
RBC # BLD AUTO: 4.51 10*6/MM3 (ref 4.2–5.4)
SODIUM BLD-SCNC: 141 MMOL/L (ref 135–153)
TRIGL SERPL-MCNC: 66 MG/DL (ref 0–150)
TSH SERPL DL<=0.05 MIU/L-ACNC: 2.01 MIU/ML (ref 0.55–4.78)
VIT B12 BLD-MCNC: 389 PG/ML (ref 211–911)
VLDLC SERPL-MCNC: 13.2 MG/DL
WBC NRBC COR # BLD: 6.21 10*3/MM3 (ref 4.5–12.5)

## 2017-09-05 PROCEDURE — 36415 COLL VENOUS BLD VENIPUNCTURE: CPT

## 2017-09-05 PROCEDURE — 82306 VITAMIN D 25 HYDROXY: CPT | Performed by: NURSE PRACTITIONER

## 2017-09-05 PROCEDURE — 83036 HEMOGLOBIN GLYCOSYLATED A1C: CPT | Performed by: NURSE PRACTITIONER

## 2017-09-05 PROCEDURE — 80061 LIPID PANEL: CPT | Performed by: NURSE PRACTITIONER

## 2017-09-05 PROCEDURE — 80050 GENERAL HEALTH PANEL: CPT | Performed by: NURSE PRACTITIONER

## 2017-09-05 PROCEDURE — 82607 VITAMIN B-12: CPT | Performed by: NURSE PRACTITIONER

## 2017-09-05 PROCEDURE — 82043 UR ALBUMIN QUANTITATIVE: CPT | Performed by: NURSE PRACTITIONER

## 2017-09-07 ENCOUNTER — OFFICE VISIT (OUTPATIENT)
Dept: FAMILY MEDICINE CLINIC | Facility: CLINIC | Age: 52
End: 2017-09-07

## 2017-09-07 VITALS
OXYGEN SATURATION: 95 % | DIASTOLIC BLOOD PRESSURE: 70 MMHG | HEART RATE: 74 BPM | TEMPERATURE: 97.3 F | WEIGHT: 251 LBS | BODY MASS INDEX: 40.34 KG/M2 | SYSTOLIC BLOOD PRESSURE: 120 MMHG | HEIGHT: 66 IN

## 2017-09-07 DIAGNOSIS — I10 ESSENTIAL HYPERTENSION: Primary | ICD-10-CM

## 2017-09-07 DIAGNOSIS — E66.01 MORBID OBESITY, UNSPECIFIED OBESITY TYPE (HCC): ICD-10-CM

## 2017-09-07 DIAGNOSIS — K21.9 GASTROESOPHAGEAL REFLUX DISEASE WITHOUT ESOPHAGITIS: ICD-10-CM

## 2017-09-07 DIAGNOSIS — M15.9 PRIMARY OSTEOARTHRITIS INVOLVING MULTIPLE JOINTS: ICD-10-CM

## 2017-09-07 PROBLEM — R89.9 ABNORMAL LABORATORY TEST RESULT: Status: ACTIVE | Noted: 2017-09-07

## 2017-09-07 PROCEDURE — 99214 OFFICE O/P EST MOD 30 MIN: CPT | Performed by: NURSE PRACTITIONER

## 2017-09-07 RX ORDER — RANITIDINE 150 MG/1
150 TABLET ORAL 2 TIMES DAILY
Qty: 60 TABLET | Refills: 5 | Status: SHIPPED | OUTPATIENT
Start: 2017-09-07 | End: 2017-10-04

## 2017-09-07 NOTE — ASSESSMENT & PLAN NOTE
Hypertension is improving with treatment.  Dietary sodium restriction.  Weight loss.  Regular aerobic exercise.  Continue current medications.  Blood pressure will be reassessed at the next regular appointment.

## 2017-09-07 NOTE — ASSESSMENT & PLAN NOTE
We have reviewed her recent radiology results.  Bone spurs and cervical spine.  Degenerative joint disease and C-spine, left shoulder and right knee.

## 2017-09-07 NOTE — ASSESSMENT & PLAN NOTE
Obesity is improving with lifestyle modifications.  Discussed the patient's BMI.  The BMI is above average; BMI management plan is completed.  General weight loss/lifestyle modification strategies discussed (elicit support from others; identify saboteurs; non-food rewards, etc).  Regular aerobic exercise program discussed.

## 2017-09-07 NOTE — PROGRESS NOTES
Subjective   Carine Franklin is a 52 y.o. female.     Chief Complaint   Patient presents with   • Knee Pain   • Results   • Hypertension   • Heartburn       History of Present Illness     Essential hypertension - patient currently receives lisinopril and hydrochlorothiazide.  She watches her salt intake.  She denies any symptoms of elevations as long as she takes her medication.    GERD - patient was unable to tolerate Protonix due to nausea and headache with an hour of taking.  She has stopped this medication on her own.  Patient states that she has tolerated Zantac in the past.    Osteoarthritis of multiple joints- patient has seen an exacerbation of her joint pain and stiffness with weight gain.  She has recent x-rays of her shoulder, neck and knee to review today.  She does report some improvement in stiffness now that she is taking Mobic.    Obesity - patient is felt multiple diets and weight loss plans.  She had a 18 pound weight gain over 6 months.  She is working on weight loss has made recommended diet changes.  She has decreased her carbohydrate intake and increased her protein intake.  She does show a 4 pound weight loss this month.      The following portions of the patient's history were reviewed and updated as appropriate: allergies, current medications, past family history, past medical history, past social history, past surgical history and problem list.    Review of Systems   Constitutional: Positive for activity change (Due to joint pain). Negative for appetite change, chills, fatigue and fever.   HENT: Negative for ear pain, facial swelling, hearing loss, sinus pressure, sore throat, trouble swallowing and voice change.    Eyes: Negative for pain, discharge and visual disturbance.   Respiratory: Negative for apnea, cough, chest tightness, shortness of breath and wheezing.    Cardiovascular: Negative for chest pain, palpitations and leg swelling.   Gastrointestinal: Negative for abdominal pain,  "blood in stool, constipation, diarrhea, nausea and vomiting.   Genitourinary: Negative for dysuria.   Musculoskeletal: Positive for arthralgias. Negative for neck stiffness.   Skin: Negative for color change.   Neurological: Negative for headaches.   Psychiatric/Behavioral: Negative for confusion and suicidal ideas. The patient is not nervous/anxious.    All other systems reviewed and are negative.      Objective     /70 (BP Location: Right arm, Patient Position: Sitting, Cuff Size: Adult)  Pulse 74  Temp 97.3 °F (36.3 °C) (Tympanic)   Ht 66\" (167.6 cm)  Wt 251 lb (114 kg)  SpO2 95%  BMI 40.51 kg/m2  Lab on 09/05/2017   Component Date Value Ref Range Status   • Glucose 09/05/2017 100  70 - 110 mg/dL Final   • BUN 09/05/2017 23* 7 - 21 mg/dL Final   • Creatinine 09/05/2017 0.79  0.43 - 1.29 mg/dL Final   • Sodium 09/05/2017 141  135 - 153 mmol/L Final   • Potassium 09/05/2017 4.1  3.5 - 5.3 mmol/L Final   • Chloride 09/05/2017 109  99 - 112 mmol/L Final   • CO2 09/05/2017 24.4  24.3 - 31.9 mmol/L Final   • Calcium 09/05/2017 9.7  7.7 - 10.0 mg/dL Final   • Total Protein 09/05/2017 7.1  6.0 - 8.0 g/dL Final   • Albumin 09/05/2017 4.10  3.50 - 5.00 g/dL Final   • ALT (SGPT) 09/05/2017 18  10 - 36 U/L Final   • AST (SGOT) 09/05/2017 20  10 - 30 U/L Final   • Alkaline Phosphatase 09/05/2017 56  35 - 104 U/L Final   • Total Bilirubin 09/05/2017 0.5  0.2 - 1.8 mg/dL Final   • eGFR Non African Amer 09/05/2017 76  >60 mL/min/1.73 Final   • Globulin 09/05/2017 3.0  gm/dL Final   • A/G Ratio 09/05/2017 1.4* 1.5 - 2.5 g/dL Final   • BUN/Creatinine Ratio 09/05/2017 29.1* 7.0 - 25.0 Final   • Anion Gap 09/05/2017 7.6  3.6 - 11.2 mmol/L Final   • TSH 09/05/2017 2.010  0.550 - 4.780 mIU/mL Final   • Hemoglobin A1C 09/05/2017 5.30  4.50 - 5.70 % Final   • 25 Hydroxy, Vitamin D 09/05/2017 42.0  ng/ml Final   • Microalbumin, Urine 09/05/2017 2.6  mg/L Final   • Total Cholesterol 09/05/2017 187  0 - 200 mg/dL Final   • " Triglycerides 09/05/2017 66  0 - 150 mg/dL Final   • HDL Cholesterol 09/05/2017 49* 60 - 100 mg/dL Final   • LDL Cholesterol  09/05/2017 125* 0 - 100 mg/dL Final   • VLDL Cholesterol 09/05/2017 13.2  mg/dL Final   • LDL/HDL Ratio 09/05/2017 2.55   Final   • Vitamin B-12 09/05/2017 389  211 - 911 pg/mL Final   • WBC 09/05/2017 6.21  4.50 - 12.50 10*3/mm3 Final   • RBC 09/05/2017 4.51  4.20 - 5.40 10*6/mm3 Final   • Hemoglobin 09/05/2017 12.5  12.0 - 16.0 g/dL Final   • Hematocrit 09/05/2017 38.6  37.0 - 47.0 % Final   • MCV 09/05/2017 85.6  80.0 - 94.0 fL Final   • MCH 09/05/2017 27.7  27.0 - 33.0 pg Final   • MCHC 09/05/2017 32.4* 33.0 - 37.0 g/dL Final   • RDW 09/05/2017 12.5  11.5 - 14.5 % Final   • RDW-SD 09/05/2017 37.6  37.0 - 54.0 fl Final   • MPV 09/05/2017 9.9  6.0 - 10.0 fL Final   • Platelets 09/05/2017 331  130 - 400 10*3/mm3 Final   • Neutrophil % 09/05/2017 50.9  30.0 - 70.0 % Final   • Lymphocyte % 09/05/2017 37.5  21.0 - 51.0 % Final   • Monocyte % 09/05/2017 8.4  0.0 - 10.0 % Final   • Eosinophil % 09/05/2017 2.7  0.0 - 5.0 % Final   • Basophil % 09/05/2017 0.3  0.0 - 2.0 % Final   • Immature Grans % 09/05/2017 0.2  0.0 - 0.5 % Final   • Neutrophils, Absolute 09/05/2017 3.16  1.40 - 6.50 10*3/mm3 Final   • Lymphocytes, Absolute 09/05/2017 2.33  1.00 - 3.00 10*3/mm3 Final   • Monocytes, Absolute 09/05/2017 0.52  0.10 - 0.90 10*3/mm3 Final   • Eosinophils, Absolute 09/05/2017 0.17  0.00 - 0.70 10*3/mm3 Final   • Basophils, Absolute 09/05/2017 0.02  0.00 - 0.30 10*3/mm3 Final   • Immature Grans, Absolute 09/05/2017 0.01  0.00 - 0.03 10*3/mm3 Final   • Osmolality Calc 09/05/2017 285.0  273.0 - 305.0 mOsm/kg Final       Physical Exam   Constitutional: She is oriented to person, place, and time. Vital signs are normal. She appears well-developed and well-nourished. No distress.   Visibly obese.    HENT:   Head: Normocephalic and atraumatic.   Right Ear: External ear normal.   Left Ear: External ear normal.    Nose: Nose normal.   Mouth/Throat: Oropharynx is clear and moist. No oropharyngeal exudate.   Eyes: EOM are normal. Pupils are equal, round, and reactive to light.   Neck: Normal range of motion. Neck supple. No tracheal deviation present. No thyromegaly present.   Cardiovascular: Normal rate, regular rhythm, normal heart sounds and intact distal pulses.  Exam reveals no gallop and no friction rub.    No murmur heard.  Pulmonary/Chest: Effort normal and breath sounds normal. No respiratory distress. She has no wheezes. She has no rales. She exhibits no tenderness.   Abdominal: Soft. Bowel sounds are normal. She exhibits no distension and no mass. There is no tenderness. There is no rebound and no guarding.   Musculoskeletal:        Left shoulder: She exhibits tenderness and crepitus.        Right knee: She exhibits bony tenderness (General stiffness).        Cervical back: She exhibits bony tenderness. She exhibits no spasm.   Lymphadenopathy:     She has no cervical adenopathy.   Neurological: She is alert and oriented to person, place, and time. She has normal reflexes.   CN 2-12 grossly intact    Skin: Skin is warm and dry. No rash noted. She is not diaphoretic. No erythema.   Psychiatric: She has a normal mood and affect. Her behavior is normal. Judgment and thought content normal.   Vitals reviewed.      Assessment/Plan     Problem List Items Addressed This Visit        Cardiovascular and Mediastinum    Essential hypertension - Primary    Current Assessment & Plan     Hypertension is improving with treatment.  Dietary sodium restriction.  Weight loss.  Regular aerobic exercise.  Continue current medications.  Blood pressure will be reassessed at the next regular appointment.            Digestive    Morbid obesity    Current Assessment & Plan     Obesity is improving with lifestyle modifications.  Discussed the patient's BMI.  The BMI is above average; BMI management plan is completed.  General weight  loss/lifestyle modification strategies discussed (elicit support from others; identify saboteurs; non-food rewards, etc).  Regular aerobic exercise program discussed.         Gastroesophageal reflux disease without esophagitis    Current Assessment & Plan     Stop Protonix.  Start Zantac 150 mg twice daily  Weight loss is recommended         Relevant Medications    raNITIdine (ZANTAC) 150 MG tablet       Musculoskeletal and Integument    Osteoarthritis of multiple joints    Current Assessment & Plan     We have reviewed her recent radiology results.  Bone spurs and cervical spine.  Degenerative joint disease and C-spine, left shoulder and right knee.               Recommend decrease her Mobic intake to 7.5 mg daily and evaluate for changes.   I have discussed diagnosis in detail today allowing time for questions and answers. Pt is aware of reasons to seek urgent or emergent medical care as well as reasons to return to the clinic for evaluation. Possible side effects, interactions and progression of symptoms discussed as well. Pt / family states understanding.   Follow-up in one month, sooner if needed.         This document has been electronically signed by:  NASRIN Can, NP-C

## 2017-09-26 ENCOUNTER — OFFICE VISIT (OUTPATIENT)
Dept: ORTHOPEDIC SURGERY | Facility: CLINIC | Age: 52
End: 2017-09-26

## 2017-09-26 VITALS
HEART RATE: 62 BPM | BODY MASS INDEX: 39.86 KG/M2 | DIASTOLIC BLOOD PRESSURE: 86 MMHG | HEIGHT: 66 IN | WEIGHT: 248 LBS | SYSTOLIC BLOOD PRESSURE: 131 MMHG

## 2017-09-26 DIAGNOSIS — R20.2 PARESTHESIA OF BOTH HANDS: Primary | ICD-10-CM

## 2017-09-26 PROCEDURE — 99203 OFFICE O/P NEW LOW 30 MIN: CPT | Performed by: ORTHOPAEDIC SURGERY

## 2017-09-26 RX ORDER — RANITIDINE 300 MG/1
TABLET ORAL
COMMUNITY
Start: 2017-08-31 | End: 2017-10-04

## 2017-09-26 RX ORDER — FLUCONAZOLE 100 MG/1
TABLET ORAL
COMMUNITY
Start: 2017-09-11 | End: 2017-10-04

## 2017-09-26 RX ORDER — LANSOPRAZOLE 30 MG/1
30 CAPSULE, DELAYED RELEASE ORAL DAILY
COMMUNITY
Start: 2017-09-14

## 2017-09-26 NOTE — PROGRESS NOTES
New Patient Visit        Patient: Carine Franklin  YOB: 1965  Date of encounter: 9/26/2017      History of Present Illness:   Carine Franklin is a 52 y.o. female who is referred here today by Paola ALEXADNRA for evaluation of left shoulder and neck pain.  She states that this started a proximal is 6 months ago.  She complains of a dull aching pain within her trapezium muscle.  She states that radiates up into her neck.  She states it's worse with range of motion of her neck.  She also complains of numbness and tingling throughout her forearm and into her hand that typically occurs at night.  She denies any pain with range of motion of her shoulder.  She's been in physical therapy in the past for her neck without any significant improvement.    PMH:   Patient Active Problem List   Diagnosis   • Gallstones   • Morbid obesity   • Essential hypertension   • Gastroesophageal reflux disease without esophagitis   • Osteoarthritis of multiple joints   • Abnormal laboratory test result     Past Medical History:   Diagnosis Date   • Acid reflux    • Gallstones    • Hypertension    • Osteoarthritis of multiple joints 8/8/2017       PSH:  Past Surgical History:   Procedure Laterality Date   • LAPAROSCOPIC TUBAL LIGATION     • LASER ABLATION     • UT LAP,CHOLECYSTECTOMY N/A 1/20/2017    Procedure: CHOLECYSTECTOMY LAPAROSCOPIC;  Surgeon: Manoj Mendez MD;  Location: [facility];  Service: General       Allergies:     Allergies   Allergen Reactions   • No Known Drug Allergy        Medications:     Current Outpatient Prescriptions:   •  fluconazole (DIFLUCAN) 100 MG tablet, , Disp: , Rfl:   •  lansoprazole (PREVACID) 30 MG capsule, , Disp: , Rfl:   •  lisinopril-hydrochlorothiazide (PRINZIDE,ZESTORETIC) 10-12.5 MG per tablet, Take one half tablet once a day.,, Disp: 15 tablet, Rfl: 3  •  meloxicam (MOBIC) 15 MG tablet, Take 1 tablet by mouth Daily., Disp: 30 tablet, Rfl: 5  •  raNITIdine (ZANTAC) 150 MG  "tablet, Take 1 tablet by mouth 2 (Two) Times a Day., Disp: 60 tablet, Rfl: 5  •  raNITIdine (ZANTAC) 300 MG tablet, , Disp: , Rfl:     Social History:  Social History     Social History   • Marital status:      Spouse name: Jesse Franklin    • Number of children: 4   • Years of education: 8th grade      Occupational History   • Retired       Social History Main Topics   • Smoking status: Former Smoker     Years: 15.00   • Smokeless tobacco: Never Used      Comment: quit 26 years ago   • Alcohol use No   • Drug use: No   • Sexual activity: Yes     Partners: Male      Comment:       Other Topics Concern   • Not on file     Social History Narrative       Family History:     Family History   Problem Relation Age of Onset   • Hypertension Mother    • Obesity Mother    • Heart attack Mother    • Heart disease Mother    • Cancer Father    • Melanoma Father    • Diabetes Sister    • Hypertension Sister    • Obesity Sister    • Sleep apnea Sister    • Hypertension Brother    • Diabetes Sister    • Hypertension Sister    • Obesity Sister    • Hypertension Brother    • Hypertension Sister    • Obesity Sister    • Obesity Maternal Grandmother        Review of Systems:   Review of Systems   Constitutional: Negative.    HENT: Negative.    Eyes: Negative.    Respiratory: Negative.    Cardiovascular: Negative.    Gastrointestinal: Negative.    Genitourinary: Negative.    Musculoskeletal:        Pertinent positives mentioned in HPI   Skin: Negative.    Neurological: Positive for numbness.   Hematological: Negative.    Psychiatric/Behavioral: Negative.        Physical Exam: 52 y.o. female  General Appearance:    Alert and oriented x 3, cooperative, in no acute distress                   Vitals:    09/26/17 0907   BP: 131/86   Pulse: 62   Weight: 248 lb (112 kg)   Height: 66\" (167.6 cm)                Musculoskeletal: Examination of the left shoulder reveals no tenderness on palpation.  She has full range of motion. "  No evidence of impingement.  She has normal strength with stressing the supraspinatus tendon and with external rotation.  She does have mild tenderness along the trapezial muscle mild pain with lateral rotation of the neck.  Her neurovascular status is grossly intact.    Radiology:     2 views of the left shoulder were reviewed revealing no acute fractures or dislocations.    3 views of the cervical spine were reviewed revealing disc space narrowing at C5 6 and C6 7.    Assessment    ICD-10-CM ICD-9-CM   1. Paresthesia of both hands R20.2 782.0       Plan:   A 52-year-old female with complaints of left cervical pain.  Her symptoms do not seem to be consistent with shoulder pain especially given and normal examination.  Given the cervical spine x-ray findings of disc space narrowing at C5 6 and C6 7 with the paresthesias we would like to further evaluate with an EMG and nerve conduction study of the bilateral upper extremities.  We'll work on getting this scheduled and she'll return back upon completion to discuss results.    Written by, Stacey ANTONY, acting as a scribe for Dr. Slim ALEXANDRA

## 2017-10-03 DIAGNOSIS — R10.13 DYSPEPSIA: ICD-10-CM

## 2017-10-03 DIAGNOSIS — R06.00 DYSPNEA, UNSPECIFIED TYPE: ICD-10-CM

## 2017-10-03 DIAGNOSIS — R53.83 FATIGUE, UNSPECIFIED TYPE: ICD-10-CM

## 2017-10-04 ENCOUNTER — DOCUMENTATION (OUTPATIENT)
Dept: BARIATRICS/WEIGHT MGMT | Facility: CLINIC | Age: 52
End: 2017-10-04

## 2017-10-04 ENCOUNTER — OFFICE VISIT (OUTPATIENT)
Dept: BARIATRICS/WEIGHT MGMT | Facility: CLINIC | Age: 52
End: 2017-10-04

## 2017-10-04 VITALS
RESPIRATION RATE: 18 BRPM | WEIGHT: 248 LBS | BODY MASS INDEX: 39.86 KG/M2 | OXYGEN SATURATION: 99 % | TEMPERATURE: 97.9 F | DIASTOLIC BLOOD PRESSURE: 85 MMHG | SYSTOLIC BLOOD PRESSURE: 137 MMHG | HEART RATE: 62 BPM | HEIGHT: 66 IN

## 2017-10-04 DIAGNOSIS — M19.90 OSTEOARTHRITIS, UNSPECIFIED OSTEOARTHRITIS TYPE, UNSPECIFIED SITE: ICD-10-CM

## 2017-10-04 DIAGNOSIS — I10 ESSENTIAL HYPERTENSION: ICD-10-CM

## 2017-10-04 DIAGNOSIS — R53.83 FATIGUE, UNSPECIFIED TYPE: Primary | ICD-10-CM

## 2017-10-04 DIAGNOSIS — E66.01 OBESITY, CLASS III, BMI 40-49.9 (MORBID OBESITY) (HCC): ICD-10-CM

## 2017-10-04 DIAGNOSIS — K21.9 GASTROESOPHAGEAL REFLUX DISEASE, ESOPHAGITIS PRESENCE NOT SPECIFIED: ICD-10-CM

## 2017-10-04 DIAGNOSIS — M54.9 BACK PAIN, UNSPECIFIED BACK LOCATION, UNSPECIFIED BACK PAIN LATERALITY, UNSPECIFIED CHRONICITY: ICD-10-CM

## 2017-10-04 PROCEDURE — 99204 OFFICE O/P NEW MOD 45 MIN: CPT | Performed by: SURGERY

## 2017-10-04 RX ORDER — CHOLECALCIFEROL (VITAMIN D3) 125 MCG
5 CAPSULE ORAL NIGHTLY PRN
COMMUNITY
End: 2018-05-07

## 2017-10-04 NOTE — PROGRESS NOTES
[facility] BARIATRIC SURGERY  [address]  [facility] [address]  [phone]      Patient  Name:  Carine Franklin  :  1965      Date of Visit: 10/4/2017      Chief Complaint:  weight gain; unable to maintain weight loss    History of Present Illness:  Carine Franklin is a 52 y.o. female who presents today for evaluation, education and consultation regarding weight loss surgery. The patient is interested in sleeve gastrectomy.     Carine has been overweight for at least 30 years, has been 35 pounds or more overweight for at least 30 years, has been 100 pounds or more overweight for 30 or more years and started dieting at age 14.  Her eating habits are described as snacker.     Previous diet attempts include: Low Carbohydrate, Low Fat, Calorie Counting, Slim Fast and dextatrim.  The most weight Carine lost was 57 pounds on low carb but was only able to maintain that weight loss for 6 months.  Her maximum lifetime weight is 288 pounds.  She has lost 40 pounds slowly over last 18 months.      She has a sister and some friends who have had sleeve and are doing great.  She watched some of the online seminar already.      Past Medical History:   Diagnosis Date   • Back pain    • Borderline diabetes    • Fatigue    • GERD (gastroesophageal reflux disease)     severe if skips daily Prevacid.  Last EGD 2016 in [place] with findings of gastritis, no HH per patient   • H. pylori infection     x 2, treated both times   • Hypertension    • Iron deficiency anemia     improved with oral MVI + iron   •  (normal spontaneous vaginal delivery)     x 4   • Osteoarthritis of multiple joints 2017     Past Surgical History:   Procedure Laterality Date   • LAPAROSCOPIC TUBAL LIGATION     • DE LAP,CHOLECYSTECTOMY N/A 2017    Procedure: CHOLECYSTECTOMY LAPAROSCOPIC;  Surgeon: Manoj Mendez MD;  Location: [facility], for stones       Allergies   Allergen Reactions   • No Known  Drug Allergy        Current Outpatient Prescriptions:   •  lansoprazole (PREVACID) 30 MG capsule, , Disp: , Rfl:   •  lisinopril-hydrochlorothiazide (PRINZIDE,ZESTORETIC) 10-12.5 MG per tablet, Take one half tablet once a day.,, Disp: 15 tablet, Rfl: 3  •  melatonin 5 MG tablet tablet, Take 5 mg by mouth., Disp: , Rfl:   •  meloxicam (MOBIC) 15 MG tablet, Take 1 tablet by mouth Daily., Disp: 30 tablet, Rfl: 5    Social History     Social History   • Marital status:      Spouse name: Jesse Franklin    • Number of children: 4   • Years of education: 8th grade      Occupational History   • Retired       worked for [employer]     Social History Main Topics   • Smoking status: Former Smoker     Years: 15.00     Types: Cigarettes   • Smokeless tobacco: Never Used      Comment: quit 26 years ago; Is not around secondhand smoke in house or car   • Alcohol use No   • Drug use: No   • Sexual activity: Yes     Partners: Male     Birth control/ protection: Surgical      Comment:       Other Topics Concern   • Not on file     Social History Narrative    Lives with      Family History   Problem Relation Age of Onset   • Hypertension Mother    • Obesity Mother    • Heart disease Mother    • Cancer Father    • Melanoma Father    • Diabetes Sister    • Hypertension Sister    • Obesity Sister    • Sleep apnea Sister    • Hypertension Brother    • Diabetes Sister    • Hypertension Sister    • Obesity Sister    • Hypertension Brother    • Hypertension Sister    • Obesity Sister    • Obesity Maternal Grandmother        Review of Systems:  Constitutional:  The patient reports fatigue, weight gain and denies fevers and chills.  Cardiovascular:  The patient reports HTN and denies HLD, CP, MI, heart disease, DVT and edema.  Respiratory:  The patient reports none and denies asthma, apnea and PE.  Gastrointestinal:  The patient reports heartburn and denies pancreatitis, liver disease and IBS.  Genitourinary:  The  patient denies renal insufficiency.    Musculoskeletal:  The patient reports joint pain, arthritis and denies fibromyalgia and autoimmune disease.  Neurological:  The patient reports none and denies seizure and stroke.  Psychiatric:  The patient reports none and denies anxiety, depression and bipolar disorder.  Endocrine:  The patient reports glucose intolerance and denies diabetes, thyroid disease and gout.  Hematologic:  The patient reports none and denies anemia and bleeding disorder.  Skin:  The patient denies MRSA.    Physical Exam:  Vital Signs:  Weight: 248 lb (112 kg)   Body mass index is 40.03 kg/(m^2).  Temp: 97.9 °F (36.6 °C)   Heart Rate: 62   BP: 137/85     Physical Exam   Constitutional: She is oriented to person, place, and time. She appears well-developed and well-nourished.   HENT:   Head: Normocephalic and atraumatic.   Mouth/Throat: No oropharyngeal exudate.   Eyes: EOM are normal. Pupils are equal, round, and reactive to light. No scleral icterus.   Neck: Normal range of motion. Neck supple. No thyromegaly present.   Cardiovascular: Normal rate, regular rhythm and normal heart sounds.    Pulmonary/Chest: Effort normal and breath sounds normal. No respiratory distress.   Abdominal: Soft. She exhibits no distension and no mass. There is no tenderness. No hernia.       Musculoskeletal: Normal range of motion. She exhibits no edema or deformity.   Neurological: She is alert and oriented to person, place, and time. No cranial nerve deficit.   Skin: Skin is warm and dry. No rash noted. She is not diaphoretic. No erythema.   Psychiatric: She has a normal mood and affect. Her behavior is normal. Judgment and thought content normal.       Patient Active Problem List   Diagnosis   • Gallstones   • Morbid obesity   • Essential hypertension   • Gastroesophageal reflux disease without esophagitis   • Osteoarthritis of multiple joints   • Abnormal laboratory test result       Assessment:    Carine Franklin is  a 52 y.o. year old female with medically complicated obesity pursuing sleeve gastrectomy.    Weight loss surgery is deemed medically necessary given the following obesity related comorbidities including hypertension, back pain, knee pain and GERD with current Weight: 248 lb (112 kg) and Body mass index is 40.03 kg/(m^2)..    She is a good candidate for weight loss surgery pending further evaluation.    Plan:  The consultation plan and program requirements were reviewed with the patient.  The patient has been advised that a letter of medical support must be obtained from her primary care physician or referring provider. A psychological evaluation will be arranged.  A nutritional evaluation will be performed.  The patient was advised to start a high protein and low carbohydrate diet.  Necessary lifestyle modifications were discussed.  Instructions on how to access JESS was given to the patient.  JESS is an internet based educational video that explains the surgical procedure chosen and answers basic questions regarding that procedure.     Preoperative testing will include: CBC, CMP, Fasting Lipids, TSH, H.Pylori, Pulmonary Function Testing, CXR, EKG and EGD (obtain records from [place] 2016.      Preop clearances required prior to surgery will include Cardiac.      Patient understands that bariatric surgery is not cosmetic surgery but rather a tool to help make a lifelong commitment to lifestyle changes including diet, exercise, behavior modifications, and healthy habits.    I spent 9 minutes discussion smoking cessation and/or avoidance of second-hand smoke.      I spent 45 minutes with the patient and 35 minutes was spent counseling.          Dana Brennan MD

## 2017-10-05 LAB
ALBUMIN SERPL-MCNC: 4.2 G/DL (ref 3.2–4.8)
ALBUMIN/GLOB SERPL: 1.5 G/DL (ref 1.5–2.5)
ALP SERPL-CCNC: 59 U/L (ref 25–100)
ALT SERPL-CCNC: 17 U/L (ref 7–40)
AST SERPL-CCNC: 19 U/L (ref 0–33)
BILIRUB SERPL-MCNC: 0.6 MG/DL (ref 0.3–1.2)
BUN SERPL-MCNC: 22 MG/DL (ref 9–23)
BUN/CREAT SERPL: 27.5 (ref 7–25)
CALCIUM SERPL-MCNC: 9.7 MG/DL (ref 8.7–10.4)
CHLORIDE SERPL-SCNC: 106 MMOL/L (ref 99–109)
CHOLEST SERPL-MCNC: 186 MG/DL (ref 0–200)
CO2 SERPL-SCNC: 29 MMOL/L (ref 20–31)
CREAT SERPL-MCNC: 0.8 MG/DL (ref 0.6–1.3)
ERYTHROCYTE [DISTWIDTH] IN BLOOD BY AUTOMATED COUNT: 13.3 % (ref 11.3–14.5)
GLOBULIN SER CALC-MCNC: 2.8 GM/DL
GLUCOSE SERPL-MCNC: 101 MG/DL (ref 70–100)
H PYLORI IGA SER-ACNC: 12.6 UNITS (ref 0–8.9)
H PYLORI IGG SER IA-ACNC: 1.1 U/ML (ref 0–0.8)
H PYLORI IGM SER-ACNC: <9 UNITS (ref 0–8.9)
HCT VFR BLD AUTO: 39.3 % (ref 34.5–44)
HDLC SERPL-MCNC: 55 MG/DL (ref 40–60)
HGB BLD-MCNC: 12.6 G/DL (ref 11.5–15.5)
LDLC SERPL CALC-MCNC: 117 MG/DL (ref 0–100)
MCH RBC QN AUTO: 27.8 PG (ref 27–31)
MCHC RBC AUTO-ENTMCNC: 32.1 G/DL (ref 32–36)
MCV RBC AUTO: 86.8 FL (ref 80–99)
PLATELET # BLD AUTO: 268 10*3/MM3 (ref 150–450)
POTASSIUM SERPL-SCNC: 4.3 MMOL/L (ref 3.5–5.5)
PROT SERPL-MCNC: 7 G/DL (ref 5.7–8.2)
RBC # BLD AUTO: 4.53 10*6/MM3 (ref 3.89–5.14)
SODIUM SERPL-SCNC: 142 MMOL/L (ref 132–146)
TRIGL SERPL-MCNC: 69 MG/DL (ref 0–150)
TSH SERPL DL<=0.005 MIU/L-ACNC: 2.97 MIU/ML (ref 0.35–5.35)
VLDLC SERPL CALC-MCNC: 13.8 MG/DL
WBC # BLD AUTO: 6.15 10*3/MM3 (ref 3.5–10.8)

## 2017-10-05 NOTE — PROGRESS NOTES
Weight Loss Surgery  Presurgical Nutrition Assessment     Carine Franklin  10/04/2017  [id number]  [id number]  1965  female    Surgery desired: Sleeve Gastrectomy    Weight: 248 lb (112 kg)   Body mass index is 40.03 kg/(m^2).  Past Medical History:   Diagnosis Date   • Back pain    • Borderline diabetes    • Fatigue    • GERD (gastroesophageal reflux disease)     severe if skips daily Prevacid.  Last EGD 2016 in [place] with findings of gastritis, no HH per patient   • H. pylori infection     x 2, treated both times   • Hypertension    • Iron deficiency anemia     improved with oral MVI + iron   •  (normal spontaneous vaginal delivery)     x 4   • Osteoarthritis of multiple joints 2017     Past Surgical History:   Procedure Laterality Date   • LAPAROSCOPIC TUBAL LIGATION     • MA LAP,CHOLECYSTECTOMY N/A 2017    Procedure: CHOLECYSTECTOMY LAPAROSCOPIC;  Surgeon: Manoj Mendez MD;  Location: [facility], for stones     Allergies   Allergen Reactions   • No Known Drug Allergy        Current Outpatient Prescriptions:   •  lansoprazole (PREVACID) 30 MG capsule, , Disp: , Rfl:   •  lisinopril-hydrochlorothiazide (PRINZIDE,ZESTORETIC) 10-12.5 MG per tablet, Take one half tablet once a day.,, Disp: 15 tablet, Rfl: 3  •  melatonin 5 MG tablet tablet, Take 5 mg by mouth., Disp: , Rfl:   •  meloxicam (MOBIC) 15 MG tablet, Take 1 tablet by mouth Daily., Disp: 30 tablet, Rfl: 5      Nutrition Assessment    Estimated energy needs: 1900    Estimated calories for weight loss: 1600    IBW (Pounds):  150      Excess body weight (Pounds):98       Nutrition Recall  24 Hour recall: (B) (L) (D) -  Reviewed and discussed with patient       Exercise  never      Education    Provided manual handouts including dietary guidelines for Sleeve Gastrectomy    Recommend that team proceed with surgery and follow per protocol.      Nutrition Goals   Dietary Guidelines per manual  Protein goal:  grams per day      Exercise Goals  Add 15-30 minutes of activity per day as tolerated        Claudia Coe, JONATHAN  10/04/2017  3:29 PM

## 2017-10-12 DIAGNOSIS — A04.8 H. PYLORI INFECTION: Primary | ICD-10-CM

## 2017-10-12 RX ORDER — CLARITHROMYCIN 500 MG/1
500 TABLET, COATED ORAL 2 TIMES DAILY
Qty: 20 TABLET | Refills: 0 | Status: SHIPPED | OUTPATIENT
Start: 2017-10-12 | End: 2017-10-22

## 2017-10-12 RX ORDER — AMOXICILLIN 500 MG/1
1000 CAPSULE ORAL 2 TIMES DAILY
Qty: 40 CAPSULE | Refills: 0 | Status: SHIPPED | OUTPATIENT
Start: 2017-10-12 | End: 2017-10-22

## 2017-10-12 RX ORDER — OMEPRAZOLE 20 MG/1
20 CAPSULE, DELAYED RELEASE ORAL 2 TIMES DAILY
Qty: 20 CAPSULE | Refills: 0 | Status: SHIPPED | OUTPATIENT
Start: 2017-10-12 | End: 2017-10-22

## 2017-11-06 ENCOUNTER — DOCUMENTATION (OUTPATIENT)
Dept: BARIATRICS/WEIGHT MGMT | Facility: CLINIC | Age: 52
End: 2017-11-06

## 2017-11-06 NOTE — PROGRESS NOTES
[facility] BARIATRIC SURGERY  [address]  [facility] [address]  [phone]        Patient  Name:  Carine Franklin  :  1965        Date of Visit:  17        Chief Complaint:  weight gain; unable to maintain weight loss     History of Present Illness:  Carine Franklin is a 52 y.o. female who presents today for evaluation, education and consultation regarding weight loss surgery. The patient is interested in sleeve gastrectomy.                 Carine has been overweight for at least 30 years, has been 35 pounds or more overweight for at least 30 years, has been 100 pounds or more overweight for 30 or more years and started dieting at age 14.  Her eating habits are described as snacker.      Previous diet attempts include: Low Carbohydrate, Low Fat, Calorie Counting, Slim Fast and dextatrim.  The most weight Carine lost was 57 pounds on low carb but was only able to maintain that weight loss for 6 months.  Her maximum lifetime weight is 288 pounds.  She has lost 40 pounds slowly over last 18 months.       She has a sister and some friends who have had sleeve and are doing great.  She watched some of the online seminar already.        Medical History         Past Medical History:   Diagnosis Date   • Back pain     • Borderline diabetes     • Fatigue     • GERD (gastroesophageal reflux disease)       severe if skips daily Prevacid.  Last EGD  in [place] with findings of gastritis, no HH per patient   • H. pylori infection       x 2, treated both times   • Hypertension     • Iron deficiency anemia       improved with oral MVI + iron   •  (normal spontaneous vaginal delivery)       x 4   • Osteoarthritis of multiple joints 2017          Surgical History          Past Surgical History:   Procedure Laterality Date   • LAPAROSCOPIC TUBAL LIGATION       • AL LAP,CHOLECYSTECTOMY N/A 2017     Procedure: CHOLECYSTECTOMY LAPAROSCOPIC;  Surgeon: Manoj Cotto  MD Andrea;  Location: [facility], for stones                 Allergies   Allergen Reactions   • No Known Drug Allergy           Current Outpatient Prescriptions:   •  lansoprazole (PREVACID) 30 MG capsule, , Disp: , Rfl:   •  lisinopril-hydrochlorothiazide (PRINZIDE,ZESTORETIC) 10-12.5 MG per tablet, Take one half tablet once a day.,, Disp: 15 tablet, Rfl: 3  •  melatonin 5 MG tablet tablet, Take 5 mg by mouth., Disp: , Rfl:   •  meloxicam (MOBIC) 15 MG tablet, Take 1 tablet by mouth Daily., Disp: 30 tablet, Rfl: 5      Social History    Social History            Social History   • Marital status:        Spouse name: Jesse Franklin    • Number of children: 4   • Years of education: 8th grade       Occupational History   • Retired          worked for [employer]             Social History Main Topics   • Smoking status: Former Smoker       Years: 15.00       Types: Cigarettes   • Smokeless tobacco: Never Used         Comment: quit 26 years ago; Is not around secondhand smoke in house or car   • Alcohol use No   • Drug use: No   • Sexual activity: Yes       Partners: Male       Birth control/ protection: Surgical         Comment:             Other Topics Concern   • Not on file          Social History Narrative     Lives with                Family History   Problem Relation Age of Onset   • Hypertension Mother     • Obesity Mother     • Heart disease Mother     • Cancer Father     • Melanoma Father     • Diabetes Sister     • Hypertension Sister     • Obesity Sister     • Sleep apnea Sister     • Hypertension Brother     • Diabetes Sister     • Hypertension Sister     • Obesity Sister     • Hypertension Brother     • Hypertension Sister     • Obesity Sister     • Obesity Maternal Grandmother           Review of Systems:  Constitutional:  The patient reports fatigue, weight gain and denies fevers and chills.  Cardiovascular:  The patient reports HTN and denies HLD, CP, MI, heart disease, DVT and  edema.  Respiratory:  The patient reports none and denies asthma, apnea and PE.  Gastrointestinal:  The patient reports heartburn and denies pancreatitis, liver disease and IBS.  Genitourinary:  The patient denies renal insufficiency.    Musculoskeletal:  The patient reports joint pain, arthritis and denies fibromyalgia and autoimmune disease.  Neurological:  The patient reports none and denies seizure and stroke.  Psychiatric:  The patient reports none and denies anxiety, depression and bipolar disorder.  Endocrine:  The patient reports glucose intolerance and denies diabetes, thyroid disease and gout.  Hematologic:  The patient reports none and denies anemia and bleeding disorder.  Skin:  The patient denies MRSA.     Physical Exam:  Vital Signs:  Weight: 248 lb (112 kg)   Body mass index is 40.03 kg/(m^2).  Temp: 97.9 °F (36.6 °C)   Heart Rate: 62   BP: 137/85      Physical Exam   Constitutional: She is oriented to person, place, and time. She appears well-developed and well-nourished.   HENT:   Head: Normocephalic and atraumatic.   Mouth/Throat: No oropharyngeal exudate.   Eyes: EOM are normal. Pupils are equal, round, and reactive to light. No scleral icterus.   Neck: Normal range of motion. Neck supple. No thyromegaly present.   Cardiovascular: Normal rate, regular rhythm and normal heart sounds.    Pulmonary/Chest: Effort normal and breath sounds normal. No respiratory distress.   Abdominal: Soft. She exhibits no distension and no mass. There is no tenderness. No hernia.       Musculoskeletal: Normal range of motion. She exhibits no edema or deformity.   Neurological: She is alert and oriented to person, place, and time. No cranial nerve deficit.   Skin: Skin is warm and dry. No rash noted. She is not diaphoretic. No erythema.   Psychiatric: She has a normal mood and affect. Her behavior is normal. Judgment and thought content normal.             Patient Active Problem List   Diagnosis   • Gallstones   •  Morbid obesity   • Essential hypertension   • Gastroesophageal reflux disease without esophagitis   • Osteoarthritis of multiple joints   • Abnormal laboratory test result         Assessment:     Carine Franklin is a 52 y.o. year old female with medically complicated obesity pursuing sleeve gastrectomy.     Weight loss surgery is deemed medically necessary given the following obesity related comorbidities including hypertension, back pain, knee pain and GERD with current Weight: 248 lb (112 kg) and Body mass index is 40.03 kg/(m^2)..     She is a good candidate for weight loss surgery pending further evaluation.     Plan:  The consultation plan and program requirements were reviewed with the patient.  The patient has been advised that a letter of medical support must be obtained from her primary care physician or referring provider. A psychological evaluation will be arranged.  A nutritional evaluation will be performed.  The patient was advised to start a high protein and low carbohydrate diet.  Necessary lifestyle modifications were discussed.  Instructions on how to access JESS was given to the patient.  JESS is an internet based educational video that explains the surgical procedure chosen and answers basic questions regarding that procedure.      Preoperative testing will include: CBC, CMP, Fasting Lipids, TSH, H.Pylori, Pulmonary Function Testing, CXR, EKG and EGD (obtain records from [place] 2016.       Preop clearances required prior to surgery will include Cardiac.       Patient understands that bariatric surgery is not cosmetic surgery but rather a tool to help make a lifelong commitment to lifestyle changes including diet, exercise, behavior modifications, and healthy habits.     I spent 9 minutes discussion smoking cessation and/or avoidance of second-hand smoke.       I spent 45 minutes with the patient and 35 minutes was spent counseling.             Dana Brennan MD

## 2017-11-12 ENCOUNTER — APPOINTMENT (OUTPATIENT)
Dept: PREADMISSION TESTING | Facility: HOSPITAL | Age: 52
End: 2017-11-12

## 2017-11-12 LAB
DEPRECATED RDW RBC AUTO: 44 FL (ref 37–54)
ERYTHROCYTE [DISTWIDTH] IN BLOOD BY AUTOMATED COUNT: 14.3 % (ref 11.3–14.5)
HCT VFR BLD AUTO: 39.2 % (ref 34.5–44)
HGB BLD-MCNC: 12.9 G/DL (ref 11.5–15.5)
MCH RBC QN AUTO: 27.9 PG (ref 27–31)
MCHC RBC AUTO-ENTMCNC: 32.9 G/DL (ref 32–36)
MCV RBC AUTO: 84.8 FL (ref 80–99)
PLATELET # BLD AUTO: 241 10*3/MM3 (ref 150–450)
PMV BLD AUTO: 8.9 FL (ref 6–12)
POTASSIUM BLD-SCNC: 4.2 MMOL/L (ref 3.5–5.5)
RBC # BLD AUTO: 4.62 10*6/MM3 (ref 3.89–5.14)
WBC NRBC COR # BLD: 6.09 10*3/MM3 (ref 3.5–10.8)

## 2017-11-12 PROCEDURE — 36415 COLL VENOUS BLD VENIPUNCTURE: CPT

## 2017-11-12 PROCEDURE — 84132 ASSAY OF SERUM POTASSIUM: CPT | Performed by: ANESTHESIOLOGY

## 2017-11-12 PROCEDURE — 85027 COMPLETE CBC AUTOMATED: CPT | Performed by: ANESTHESIOLOGY

## 2017-11-12 PROCEDURE — 93005 ELECTROCARDIOGRAM TRACING: CPT

## 2017-11-12 PROCEDURE — 93010 ELECTROCARDIOGRAM REPORT: CPT | Performed by: INTERNAL MEDICINE

## 2017-11-13 ENCOUNTER — OUTSIDE FACILITY SERVICE (OUTPATIENT)
Dept: BARIATRICS/WEIGHT MGMT | Facility: CLINIC | Age: 52
End: 2017-11-13

## 2017-11-13 ENCOUNTER — LAB REQUISITION (OUTPATIENT)
Dept: LAB | Facility: HOSPITAL | Age: 52
End: 2017-11-13

## 2017-11-13 DIAGNOSIS — A04.8 OTHER SPECIFIED BACTERIAL INTESTINAL INFECTIONS: ICD-10-CM

## 2017-11-13 PROCEDURE — 43239 EGD BIOPSY SINGLE/MULTIPLE: CPT | Performed by: SURGERY

## 2017-11-13 PROCEDURE — 88305 TISSUE EXAM BY PATHOLOGIST: CPT | Performed by: SURGERY

## 2017-11-14 LAB
CYTO UR: NORMAL
LAB AP CASE REPORT: NORMAL
LAB AP CLINICAL INFORMATION: NORMAL
Lab: NORMAL
PATH REPORT.FINAL DX SPEC: NORMAL
PATH REPORT.GROSS SPEC: NORMAL

## 2018-01-12 ENCOUNTER — TRANSCRIBE ORDERS (OUTPATIENT)
Dept: ADMINISTRATIVE | Facility: HOSPITAL | Age: 53
End: 2018-01-12

## 2018-01-12 DIAGNOSIS — R06.02 SOB (SHORTNESS OF BREATH): Primary | ICD-10-CM

## 2018-01-16 DIAGNOSIS — R10.13 DYSPEPSIA: Primary | ICD-10-CM

## 2018-01-16 DIAGNOSIS — R10.13 DYSPEPSIA: ICD-10-CM

## 2018-01-25 ENCOUNTER — HOSPITAL ENCOUNTER (OUTPATIENT)
Dept: RESPIRATORY THERAPY | Facility: HOSPITAL | Age: 53
Discharge: HOME | End: 2018-01-25
Admitting: PHYSICIAN ASSISTANT

## 2018-01-25 DIAGNOSIS — R06.02 SOB (SHORTNESS OF BREATH): ICD-10-CM

## 2018-01-25 PROCEDURE — 94060 EVALUATION OF WHEEZING: CPT

## 2018-01-25 PROCEDURE — 94060 EVALUATION OF WHEEZING: CPT | Performed by: INTERNAL MEDICINE

## 2018-01-31 DIAGNOSIS — R53.83 FATIGUE, UNSPECIFIED TYPE: ICD-10-CM

## 2018-01-31 DIAGNOSIS — R06.00 DYSPNEA, UNSPECIFIED TYPE: Primary | ICD-10-CM

## 2018-02-07 DIAGNOSIS — R53.83 FATIGUE, UNSPECIFIED TYPE: ICD-10-CM

## 2018-02-07 DIAGNOSIS — R06.00 DYSPNEA, UNSPECIFIED TYPE: ICD-10-CM

## 2018-02-12 ENCOUNTER — CONSULT (OUTPATIENT)
Dept: BARIATRICS/WEIGHT MGMT | Facility: CLINIC | Age: 53
End: 2018-02-12

## 2018-02-12 VITALS
SYSTOLIC BLOOD PRESSURE: 136 MMHG | OXYGEN SATURATION: 99 % | HEIGHT: 66 IN | BODY MASS INDEX: 40.5 KG/M2 | DIASTOLIC BLOOD PRESSURE: 79 MMHG | RESPIRATION RATE: 18 BRPM | HEART RATE: 62 BPM | WEIGHT: 252 LBS | TEMPERATURE: 97.8 F

## 2018-02-12 DIAGNOSIS — M54.9 BACK PAIN, UNSPECIFIED BACK LOCATION, UNSPECIFIED BACK PAIN LATERALITY, UNSPECIFIED CHRONICITY: ICD-10-CM

## 2018-02-12 DIAGNOSIS — R10.13 DYSPEPSIA: ICD-10-CM

## 2018-02-12 DIAGNOSIS — M19.90 OSTEOARTHRITIS, UNSPECIFIED OSTEOARTHRITIS TYPE, UNSPECIFIED SITE: ICD-10-CM

## 2018-02-12 DIAGNOSIS — E66.01 OBESITY, CLASS III, BMI 40-49.9 (MORBID OBESITY) (HCC): ICD-10-CM

## 2018-02-12 DIAGNOSIS — I10 ESSENTIAL HYPERTENSION: ICD-10-CM

## 2018-02-12 DIAGNOSIS — A04.8 H. PYLORI INFECTION: ICD-10-CM

## 2018-02-12 DIAGNOSIS — K21.9 GASTROESOPHAGEAL REFLUX DISEASE, ESOPHAGITIS PRESENCE NOT SPECIFIED: ICD-10-CM

## 2018-02-12 DIAGNOSIS — R53.83 FATIGUE, UNSPECIFIED TYPE: Primary | ICD-10-CM

## 2018-02-12 DIAGNOSIS — R06.00 DYSPNEA, UNSPECIFIED TYPE: ICD-10-CM

## 2018-02-12 PROCEDURE — 99214 OFFICE O/P EST MOD 30 MIN: CPT | Performed by: SURGERY

## 2018-02-12 RX ORDER — PANTOPRAZOLE SODIUM 40 MG/10ML
40 INJECTION, POWDER, LYOPHILIZED, FOR SOLUTION INTRAVENOUS ONCE
Status: CANCELLED | OUTPATIENT
Start: 2018-02-12 | End: 2018-02-12

## 2018-02-12 RX ORDER — SODIUM CHLORIDE 9 MG/ML
150 INJECTION, SOLUTION INTRAVENOUS CONTINUOUS
Status: CANCELLED | OUTPATIENT
Start: 2018-02-12

## 2018-02-12 RX ORDER — SCOLOPAMINE TRANSDERMAL SYSTEM 1 MG/1
1 PATCH, EXTENDED RELEASE TRANSDERMAL
Status: CANCELLED | OUTPATIENT
Start: 2018-02-12

## 2018-02-12 NOTE — PROGRESS NOTES
[facility] BARIATRIC SURGERY  [address]  formerly [facility] [address]  [phone]      Patient  Name:  Carine Franklin  :  1965      Date of Visit: 2018      Chief Complaint:  weight gain; unable to maintain weight loss    History of Present Illness:  Carine Franklin is a 53 y.o. female who presents today for evaluation, education and consultation regarding weight loss surgery.     Patient has been overweight for many years, with numerous failed dietary/weight loss attempts.  She now has obesity related comorbidities of Hypertension, GERD, osteoarthritis and as such has decided to pursue weight loss surgery.    She reports no changes to the medical history.  She prefers to have surgery in [place].      Review of data:    PERRY: reviewed  CBC: nl  CMP: nl  EGD: PQ 17 small HH, 38 cm, gastritis  HP negative  Cardiac clearance: low risk  Echo: n/a  Stress test: no evidence of ischemia, EF 71% ()  PFTs: normal  Pulm clearance: n/a  EKG: normal  CXR: normal      Last tobacco: n/a  Last NSAIDs: Mobic yesterday  Last ASA: 2-3 weeks ago  Last steroids: n/a  Last hormones: n/a      Past Medical History:   Diagnosis Date   • Back pain    • Borderline diabetes    • Fatigue    • GERD (gastroesophageal reflux disease)     severe if skips daily Prevacid.  Last EGD 2016 in [place] with findings of gastritis, no HH per patient   • H. pylori infection     x 2, treated both times   • Hypertension    • Iron deficiency anemia     improved with oral MVI + iron   •  (normal spontaneous vaginal delivery)     x 4   • Osteoarthritis of multiple joints 2017     Past Surgical History:   Procedure Laterality Date   • LAPAROSCOPIC TUBAL LIGATION     • CA LAP,CHOLECYSTECTOMY N/A 2017    Procedure: CHOLECYSTECTOMY LAPAROSCOPIC;  Surgeon: Manoj Mendez MD;  Location: [facility], for stones       Allergies   Allergen Reactions   • No Known Drug Allergy        Current  Outpatient Prescriptions:   •  lansoprazole (PREVACID) 30 MG capsule, , Disp: , Rfl:   •  lisinopril-hydrochlorothiazide (PRINZIDE,ZESTORETIC) 10-12.5 MG per tablet, Take one half tablet once a day.,, Disp: 15 tablet, Rfl: 3  •  melatonin 5 MG tablet tablet, Take 5 mg by mouth., Disp: , Rfl:   •  meloxicam (MOBIC) 15 MG tablet, Take 1 tablet by mouth Daily. (Patient taking differently: Take 7.5 mg by mouth 2 (Two) Times a Day As Needed.), Disp: 30 tablet, Rfl: 5    Social History     Social History   • Marital status:      Spouse name: Jesse Franklin    • Number of children: 4   • Years of education: 8th grade      Occupational History   • Retired       worked for [employer]     Social History Main Topics   • Smoking status: Former Smoker     Years: 15.00     Types: Cigarettes   • Smokeless tobacco: Never Used      Comment: quit 26 years ago; Is not around secondhand smoke in house or car   • Alcohol use No   • Drug use: No   • Sexual activity: Yes     Partners: Male     Birth control/ protection: Surgical      Comment:       Other Topics Concern   • Not on file     Social History Narrative    Lives with      Family History   Problem Relation Age of Onset   • Hypertension Mother    • Obesity Mother    • Heart disease Mother    • Cancer Father    • Melanoma Father    • Diabetes Sister    • Hypertension Sister    • Obesity Sister    • Sleep apnea Sister    • Hypertension Brother    • Diabetes Sister    • Hypertension Sister    • Obesity Sister    • Hypertension Brother    • Hypertension Sister    • Obesity Sister    • Obesity Maternal Grandmother        Review of Systems:  Constitutional:  The patient reports fatigue, weight gain and denies fevers and chills.  Cardiovascular:  The patient reports HTN and denies HLD, CP, MI, heart disease, DVT and edema.  Respiratory:  The patient reports none and denies asthma, apnea and PE.  Gastrointestinal:  The patient reports heartburn and denies  pancreatitis, liver disease and IBS.  Genitourinary:  The patient denies renal insufficiency.    Musculoskeletal:  The patient reports joint pain, arthritis and denies fibromyalgia and autoimmune disease.  Neurological:  The patient reports none and denies seizure and stroke.  Psychiatric:  The patient reports none and denies anxiety, depression and bipolar disorder.  Endocrine:  The patient reports glucose intolerance and denies diabetes, thyroid disease and gout.  Hematologic:  The patient reports none and denies anemia and bleeding disorder.  Skin:  The patient denies MRSA.        Physical Exam:  Vital Signs:  Weight: 114 kg (252 lb)   Body mass index is 40.67 kg/(m^2).  Temp: 97.8 °F (36.6 °C)   Heart Rate: 62   BP: 136/79     Physical Exam   Constitutional: She is oriented to person, place, and time. She appears well-developed and well-nourished. No distress.   HENT:   Head: Normocephalic and atraumatic.   Mouth/Throat: No oropharyngeal exudate.   Eyes: Conjunctivae and EOM are normal. Pupils are equal, round, and reactive to light.   Neck: Normal range of motion. Neck supple. No tracheal deviation present. No thyromegaly present.   Cardiovascular: Normal rate, regular rhythm and normal heart sounds.    Pulmonary/Chest: Effort normal and breath sounds normal. No respiratory distress.   Abdominal: Soft. She exhibits no distension. There is no tenderness.   Scattered lap scars   Musculoskeletal: Normal range of motion. She exhibits no edema or deformity.   Neurological: She is alert and oriented to person, place, and time. No cranial nerve deficit.   Skin: Skin is warm and dry. No rash noted. She is not diaphoretic. No erythema.   Psychiatric: She has a normal mood and affect. Her behavior is normal. Judgment and thought content normal.       Patient Active Problem List   Diagnosis   • Gallstones   • Morbid obesity   • Essential hypertension   • Gastroesophageal reflux disease without esophagitis   •  "Osteoarthritis of multiple joints   • Abnormal laboratory test result       Assessment:    Carine Franklin is a 53 y.o. year old female with medically complicated obesity.    Weight loss surgery is deemed medically necessary given the following obesity related comorbidities including Hypertension, GERD, osteoarthritis with current Weight: 114 kg (252 lb) and Body mass index is 40.67 kg/(m^2)..    Patient is aware that surgery is a tool, and that weight loss is not guaranteed but only seen in the context of appropriate use, follow up and exercise.    The patient was present for an approximately a 2.5 hour discussion of the purpose of weight loss surgery, how WLS is a tool to assist in achieving weight loss goals, the most common complications and how best to avoid them, and the strategies for short and long term weight loss.  Ample opportunity to discuss questions was available both in group and during the time of individual examination.    I reviewed all available preop labs, Xrays, tests, clearances, etc and signed off on these in the record.  All of this in addition to the patient's unique history and exam has been taken into consideration in determining their appropriate candidacy for weight loss surgery.    Complications  of laparoscopic/possible robotic gastric sleeve were discussed. The patient is well aware of the potential complications of surgery that include but not limited to bleeding, infections, deep venous thrombosis, pulmonary embolism, pulmonary complications such as pneumonia, cardiac events, hernias, small bowel obstruction, damage to the spleen or other organs, bowel injury, disfiguring scars, failure to lose weight, need for additional surgery, conversion to an open procedure, and death. Patient is also aware of complications which apply in this particular procedure that can include but are not limited to a \"leak\" at the staple line which in some instances may require conversion to gastric " bypass.    The patient is aware if a hiatal hernia is encountered, it likely will be repaired.  R/B/A Rx to hiatal hernia repair were discussed as outlined in our long consent form.  Briefly risks in addition to those for LSG include recurrent hernia, ROBYN, dysphagia, esophageal injury, pneumothorax, injury to the vagus nerves, injury to the thoracic duct, aorta or vena cava.    Greater than 3 minutes was spent with the patient discussing avoiding all tobacco products and second hand smoke at least 2 weeks pre-operatively and 6 weeks post-operatively to minimize the risk of sleeve leak.  This included discussing the importance of avoiding even secondhand smoke as the risk of leak is increased.  Examples discussed:  I made it very clear that the patient understands they should avoid even riding in a car where someone has previously smoked in the last 2 weeks, living in a house where someone smokes (even if it's in a separate room/patio/attached garage, etc.) we discussed that they should not have a conversation with a group of people who are smoking even if it's outside.  They can be around wood burning fires and barbecue.  I told them I do not know if marijuana has a same effects but my overall recommendation is to avoid it for 2 weeks prior in 6 weeks after surgery.  They also are aware that nicotine may also increase the risk of leak and I strongly encouraged him to avoid that as well for 2 weeks prior in 6 weeks after surgery.    Discussed the risks, benefits and alternative therapies at great length as outlined in our extensive consent forms, consent videos, and educational teaching process under the direction of the center's .    A copy of the patient's signed informed consent is on file.    Plan:  Laparoscopic sleeve gastrectomy + hiatal hernia repair.        Dana Brennan MD

## 2018-02-12 NOTE — H&P
[facility] BARIATRIC SURGERY  [address]  [facility] [address]  [phone]        Patient  Name:  Carine Franklin  :  1965        Date of Visit: 2018        Chief Complaint:  weight gain; unable to maintain weight loss     History of Present Illness:  Carine Franklin is a 53 y.o. female who presents today for evaluation, education and consultation regarding weight loss surgery.      Patient has been overweight for many years, with numerous failed dietary/weight loss attempts.  She now has obesity related comorbidities of Hypertension, GERD, osteoarthritis and as such has decided to pursue weight loss surgery.     She reports no changes to the medical history.  She prefers to have surgery in [place].       Review of data:     PERRY: reviewed  CBC: nl  CMP: nl  EGD: PQ 17 small HH, 38 cm, gastritis  HP negative  Cardiac clearance: low risk  Echo: n/a  Stress test: no evidence of ischemia, EF 71% ()  PFTs: normal  Pulm clearance: n/a  EKG: normal  CXR: normal        Last tobacco: n/a  Last NSAIDs: Mobic yesterday  Last ASA: 2-3 weeks ago  Last steroids: n/a  Last hormones: n/a         Medical History         Past Medical History:   Diagnosis Date   • Back pain     • Borderline diabetes     • Fatigue     • GERD (gastroesophageal reflux disease)       severe if skips daily Prevacid.  Last EGD 2016 in [place] with findings of gastritis, no HH per patient   • H. pylori infection       x 2, treated both times   • Hypertension     • Iron deficiency anemia       improved with oral MVI + iron   •  (normal spontaneous vaginal delivery)       x 4   • Osteoarthritis of multiple joints 2017          Surgical History          Past Surgical History:   Procedure Laterality Date   • LAPAROSCOPIC TUBAL LIGATION       • [place] LAP,CHOLECYSTECTOMY N/A 2017     Procedure: CHOLECYSTECTOMY LAPAROSCOPIC;  Surgeon: Manoj Mendez MD;  Location: [facility]  stones                 Allergies   Allergen Reactions   • No Known Drug Allergy           Current Outpatient Prescriptions:   •  lansoprazole (PREVACID) 30 MG capsule, , Disp: , Rfl:   •  lisinopril-hydrochlorothiazide (PRINZIDE,ZESTORETIC) 10-12.5 MG per tablet, Take one half tablet once a day.,, Disp: 15 tablet, Rfl: 3  •  melatonin 5 MG tablet tablet, Take 5 mg by mouth., Disp: , Rfl:   •  meloxicam (MOBIC) 15 MG tablet, Take 1 tablet by mouth Daily. (Patient taking differently: Take 7.5 mg by mouth 2 (Two) Times a Day As Needed.), Disp: 30 tablet, Rfl: 5      Social History    Social History            Social History   • Marital status:        Spouse name: Jesse Franklin    • Number of children: 4   • Years of education: 8th grade       Occupational History   • Retired          worked for [employer]             Social History Main Topics   • Smoking status: Former Smoker       Years: 15.00       Types: Cigarettes   • Smokeless tobacco: Never Used         Comment: quit 26 years ago; Is not around secondhand smoke in house or car   • Alcohol use No   • Drug use: No   • Sexual activity: Yes       Partners: Male       Birth control/ protection: Surgical         Comment:             Other Topics Concern   • Not on file          Social History Narrative     Lives with                Family History   Problem Relation Age of Onset   • Hypertension Mother     • Obesity Mother     • Heart disease Mother     • Cancer Father     • Melanoma Father     • Diabetes Sister     • Hypertension Sister     • Obesity Sister     • Sleep apnea Sister     • Hypertension Brother     • Diabetes Sister     • Hypertension Sister     • Obesity Sister     • Hypertension Brother     • Hypertension Sister     • Obesity Sister     • Obesity Maternal Grandmother           Review of Systems:  Constitutional:  The patient reports fatigue, weight gain and denies fevers and chills.  Cardiovascular:  The patient reports HTN  and denies HLD, CP, MI, heart disease, DVT and edema.  Respiratory:  The patient reports none and denies asthma, apnea and PE.  Gastrointestinal:  The patient reports heartburn and denies pancreatitis, liver disease and IBS.  Genitourinary:  The patient denies renal insufficiency.    Musculoskeletal:  The patient reports joint pain, arthritis and denies fibromyalgia and autoimmune disease.  Neurological:  The patient reports none and denies seizure and stroke.  Psychiatric:  The patient reports none and denies anxiety, depression and bipolar disorder.  Endocrine:  The patient reports glucose intolerance and denies diabetes, thyroid disease and gout.  Hematologic:  The patient reports none and denies anemia and bleeding disorder.  Skin:  The patient denies MRSA.           Physical Exam:  Vital Signs:  Weight: 114 kg (252 lb)   Body mass index is 40.67 kg/(m^2).  Temp: 97.8 °F (36.6 °C)   Heart Rate: 62   BP: 136/79      Physical Exam   Constitutional: She is oriented to person, place, and time. She appears well-developed and well-nourished. No distress.   HENT:   Head: Normocephalic and atraumatic.   Mouth/Throat: No oropharyngeal exudate.   Eyes: Conjunctivae and EOM are normal. Pupils are equal, round, and reactive to light.   Neck: Normal range of motion. Neck supple. No tracheal deviation present. No thyromegaly present.   Cardiovascular: Normal rate, regular rhythm and normal heart sounds.    Pulmonary/Chest: Effort normal and breath sounds normal. No respiratory distress.   Abdominal: Soft. She exhibits no distension. There is no tenderness.   Scattered lap scars   Musculoskeletal: Normal range of motion. She exhibits no edema or deformity.   Neurological: She is alert and oriented to person, place, and time. No cranial nerve deficit.   Skin: Skin is warm and dry. No rash noted. She is not diaphoretic. No erythema.   Psychiatric: She has a normal mood and affect. Her behavior is normal. Judgment and thought  content normal.             Patient Active Problem List   Diagnosis   • Gallstones   • Morbid obesity   • Essential hypertension   • Gastroesophageal reflux disease without esophagitis   • Osteoarthritis of multiple joints   • Abnormal laboratory test result         Assessment:     Carine Franklin is a 53 y.o. year old female with medically complicated obesity.     Weight loss surgery is deemed medically necessary given the following obesity related comorbidities including Hypertension, GERD, osteoarthritis with current Weight: 114 kg (252 lb) and Body mass index is 40.67 kg/(m^2)..     Patient is aware that surgery is a tool, and that weight loss is not guaranteed but only seen in the context of appropriate use, follow up and exercise.     The patient was present for an approximately a 2.5 hour discussion of the purpose of weight loss surgery, how WLS is a tool to assist in achieving weight loss goals, the most common complications and how best to avoid them, and the strategies for short and long term weight loss.  Ample opportunity to discuss questions was available both in group and during the time of individual examination.     I reviewed all available preop labs, Xrays, tests, clearances, etc and signed off on these in the record.  All of this in addition to the patient's unique history and exam has been taken into consideration in determining their appropriate candidacy for weight loss surgery.     Complications  of laparoscopic/possible robotic gastric sleeve were discussed. The patient is well aware of the potential complications of surgery that include but not limited to bleeding, infections, deep venous thrombosis, pulmonary embolism, pulmonary complications such as pneumonia, cardiac events, hernias, small bowel obstruction, damage to the spleen or other organs, bowel injury, disfiguring scars, failure to lose weight, need for additional surgery, conversion to an open procedure, and death. Patient is  "also aware of complications which apply in this particular procedure that can include but are not limited to a \"leak\" at the staple line which in some instances may require conversion to gastric bypass.     The patient is aware if a hiatal hernia is encountered, it likely will be repaired.  R/B/A Rx to hiatal hernia repair were discussed as outlined in our long consent form.  Briefly risks in addition to those for LSG include recurrent hernia, ROBYN, dysphagia, esophageal injury, pneumothorax, injury to the vagus nerves, injury to the thoracic duct, aorta or vena cava.     Greater than 3 minutes was spent with the patient discussing avoiding all tobacco products and second hand smoke at least 2 weeks pre-operatively and 6 weeks post-operatively to minimize the risk of sleeve leak.  This included discussing the importance of avoiding even secondhand smoke as the risk of leak is increased.  Examples discussed:  I made it very clear that the patient understands they should avoid even riding in a car where someone has previously smoked in the last 2 weeks, living in a house where someone smokes (even if it's in a separate room/patio/attached garage, etc.) we discussed that they should not have a conversation with a group of people who are smoking even if it's outside.  They can be around wood burning fires and barbecue.  I told them I do not know if marijuana has a same effects but my overall recommendation is to avoid it for 2 weeks prior in 6 weeks after surgery.  They also are aware that nicotine may also increase the risk of leak and I strongly encouraged him to avoid that as well for 2 weeks prior in 6 weeks after surgery.     Discussed the risks, benefits and alternative therapies at great length as outlined in our extensive consent forms, consent videos, and educational teaching process under the direction of the center's .     A copy of the patient's signed informed consent is on file.     Plan: "  Laparoscopic sleeve gastrectomy + hiatal hernia repair.           Dana Brennan MD

## 2018-02-20 ENCOUNTER — APPOINTMENT (OUTPATIENT)
Dept: PREADMISSION TESTING | Facility: HOSPITAL | Age: 53
End: 2018-02-20

## 2018-02-20 VITALS — BODY MASS INDEX: 39.7 KG/M2 | WEIGHT: 247 LBS | HEIGHT: 66 IN

## 2018-02-20 RX ORDER — MULTIPLE VITAMINS W/ MINERALS TAB 9MG-400MCG
1 TAB ORAL DAILY
COMMUNITY
End: 2019-07-25

## 2018-02-22 ENCOUNTER — ANESTHESIA EVENT (OUTPATIENT)
Dept: PERIOP | Facility: HOSPITAL | Age: 53
End: 2018-02-22

## 2018-02-23 ENCOUNTER — ANESTHESIA (OUTPATIENT)
Dept: PERIOP | Facility: HOSPITAL | Age: 53
End: 2018-02-23

## 2018-02-23 ENCOUNTER — HOSPITAL ENCOUNTER (INPATIENT)
Facility: HOSPITAL | Age: 53
LOS: 1 days | Discharge: HOME | End: 2018-02-24
Attending: SURGERY | Admitting: SURGERY

## 2018-02-23 DIAGNOSIS — E66.01 OBESITY, CLASS III, BMI 40-49.9 (MORBID OBESITY) (HCC): ICD-10-CM

## 2018-02-23 PROBLEM — R53.83 FATIGUE: Status: ACTIVE | Noted: 2018-02-23

## 2018-02-23 PROCEDURE — 25010000002 DIPHENHYDRAMINE PER 50 MG: Performed by: NURSE ANESTHETIST, CERTIFIED REGISTERED

## 2018-02-23 PROCEDURE — 82962 GLUCOSE BLOOD TEST: CPT

## 2018-02-23 PROCEDURE — 25010000002 DEXAMETHASONE PER 1 MG: Performed by: NURSE ANESTHETIST, CERTIFIED REGISTERED

## 2018-02-23 PROCEDURE — 25010000002 PROPOFOL 200 MG/20ML EMULSION: Performed by: NURSE ANESTHETIST, CERTIFIED REGISTERED

## 2018-02-23 PROCEDURE — 25010000002 ONDANSETRON PER 1 MG

## 2018-02-23 PROCEDURE — 94799 UNLISTED PULMONARY SVC/PX: CPT

## 2018-02-23 PROCEDURE — 25010000002 FENTANYL CITRATE (PF) 250 MCG/5ML SOLUTION: Performed by: NURSE ANESTHETIST, CERTIFIED REGISTERED

## 2018-02-23 PROCEDURE — 25010000002 HYDROMORPHONE PER 4 MG: Performed by: SURGERY

## 2018-02-23 PROCEDURE — 0DJ08ZZ INSPECTION OF UPPER INTESTINAL TRACT, VIA NATURAL OR ARTIFICIAL OPENING ENDOSCOPIC: ICD-10-PCS | Performed by: SURGERY

## 2018-02-23 PROCEDURE — 25010000003 CEFAZOLIN PER 500 MG: Performed by: SURGERY

## 2018-02-23 PROCEDURE — 0DB64Z3 EXCISION OF STOMACH, PERCUTANEOUS ENDOSCOPIC APPROACH, VERTICAL: ICD-10-PCS | Performed by: SURGERY

## 2018-02-23 PROCEDURE — 25010000002 HALOPERIDOL LACTATE PER 5 MG: Performed by: NURSE ANESTHETIST, CERTIFIED REGISTERED

## 2018-02-23 PROCEDURE — 25010000002 ENOXAPARIN PER 10 MG: Performed by: SURGERY

## 2018-02-23 PROCEDURE — 25010000002 ONDANSETRON PER 1 MG: Performed by: NURSE ANESTHETIST, CERTIFIED REGISTERED

## 2018-02-23 PROCEDURE — 43775 LAP SLEEVE GASTRECTOMY: CPT | Performed by: SURGERY

## 2018-02-23 PROCEDURE — 25010000002 SUCCINYLCHOLINE PER 20 MG: Performed by: NURSE ANESTHETIST, CERTIFIED REGISTERED

## 2018-02-23 PROCEDURE — 25010000002 PROPOFOL 1000 MG/ML EMULSION: Performed by: NURSE ANESTHETIST, CERTIFIED REGISTERED

## 2018-02-23 PROCEDURE — 88307 TISSUE EXAM BY PATHOLOGIST: CPT | Performed by: SURGERY

## 2018-02-23 PROCEDURE — 25010000002 MIDAZOLAM PER 1 MG: Performed by: NURSE ANESTHETIST, CERTIFIED REGISTERED

## 2018-02-23 PROCEDURE — 0BQT4ZZ REPAIR DIAPHRAGM, PERCUTANEOUS ENDOSCOPIC APPROACH: ICD-10-PCS | Performed by: SURGERY

## 2018-02-23 PROCEDURE — 25010000002 PROMETHAZINE PER 50 MG: Performed by: NURSE ANESTHETIST, CERTIFIED REGISTERED

## 2018-02-23 DEVICE — PERI-STRIPS DRY WITH VERITAS COLLAGEN MATRIX (PSD-V) IS PREPARED FROM DEHYDRATED BOVINE PERICARDIUM PROCURED FROM CATTLE UNDER 30 MONTHS OF AGE IN THE UNITED STATES. ONE (1) TUBE OF PSD GEL (GEL) IS PROVIDED FOR EVERY TWO (2) POUCHES OF PSD-V. THE GEL IS USED TO CREATE A TEMPORARY BOND BETWEEN THE PSD-V BUTTRESS AND THE SURGICAL STAPLER JAWS UNTIL THE STAPLER IS POSITIONED AND FIRED.
Type: IMPLANTABLE DEVICE | Site: ABDOMEN | Status: FUNCTIONAL
Brand: PERI-STRIPS DRY WITH VERITAS COLLAGEN MATRIX

## 2018-02-23 RX ORDER — SCOLOPAMINE TRANSDERMAL SYSTEM 1 MG/1
PATCH, EXTENDED RELEASE TRANSDERMAL
Status: DISCONTINUED
Start: 2018-02-23 | End: 2018-02-24 | Stop reason: HOSPADM

## 2018-02-23 RX ORDER — SODIUM CHLORIDE AND POTASSIUM CHLORIDE 150; 450 MG/100ML; MG/100ML
100 INJECTION, SOLUTION INTRAVENOUS CONTINUOUS
Status: DISCONTINUED | OUTPATIENT
Start: 2018-02-23 | End: 2018-02-23

## 2018-02-23 RX ORDER — LORAZEPAM 0.5 MG/1
1 TABLET ORAL EVERY 12 HOURS PRN
Status: DISCONTINUED | OUTPATIENT
Start: 2018-02-23 | End: 2018-02-24 | Stop reason: HOSPADM

## 2018-02-23 RX ORDER — SODIUM CHLORIDE 9 MG/ML
INJECTION, SOLUTION INTRAVENOUS AS NEEDED
Status: DISCONTINUED | OUTPATIENT
Start: 2018-02-23 | End: 2018-02-23 | Stop reason: HOSPADM

## 2018-02-23 RX ORDER — PROMETHAZINE HYDROCHLORIDE 25 MG/ML
12.5 INJECTION, SOLUTION INTRAMUSCULAR; INTRAVENOUS EVERY 6 HOURS PRN
Status: DISCONTINUED | OUTPATIENT
Start: 2018-02-23 | End: 2018-02-24 | Stop reason: HOSPADM

## 2018-02-23 RX ORDER — METOCLOPRAMIDE HYDROCHLORIDE 5 MG/ML
10 INJECTION INTRAMUSCULAR; INTRAVENOUS EVERY 6 HOURS PRN
Status: DISCONTINUED | OUTPATIENT
Start: 2018-02-23 | End: 2018-02-24 | Stop reason: HOSPADM

## 2018-02-23 RX ORDER — PANTOPRAZOLE SODIUM 40 MG/10ML
40 INJECTION, POWDER, LYOPHILIZED, FOR SOLUTION INTRAVENOUS ONCE
Status: COMPLETED | OUTPATIENT
Start: 2018-02-23 | End: 2018-02-23

## 2018-02-23 RX ORDER — DIPHENHYDRAMINE HYDROCHLORIDE 50 MG/ML
25 INJECTION INTRAMUSCULAR; INTRAVENOUS EVERY 4 HOURS PRN
Status: DISCONTINUED | OUTPATIENT
Start: 2018-02-23 | End: 2018-02-24 | Stop reason: HOSPADM

## 2018-02-23 RX ORDER — SODIUM CHLORIDE AND POTASSIUM CHLORIDE 150; 450 MG/100ML; MG/100ML
100 INJECTION, SOLUTION INTRAVENOUS CONTINUOUS
Status: DISCONTINUED | OUTPATIENT
Start: 2018-02-24 | End: 2018-02-24 | Stop reason: HOSPADM

## 2018-02-23 RX ORDER — ONDANSETRON 4 MG/1
4 TABLET, FILM COATED ORAL EVERY 6 HOURS PRN
Status: DISCONTINUED | OUTPATIENT
Start: 2018-02-23 | End: 2018-02-24 | Stop reason: HOSPADM

## 2018-02-23 RX ORDER — PANTOPRAZOLE SODIUM 40 MG/10ML
INJECTION, POWDER, LYOPHILIZED, FOR SOLUTION INTRAVENOUS
Status: COMPLETED
Start: 2018-02-23 | End: 2018-02-23

## 2018-02-23 RX ORDER — ROCURONIUM BROMIDE 10 MG/ML
INJECTION, SOLUTION INTRAVENOUS AS NEEDED
Status: DISCONTINUED | OUTPATIENT
Start: 2018-02-23 | End: 2018-02-23 | Stop reason: SURG

## 2018-02-23 RX ORDER — ONDANSETRON 2 MG/ML
INJECTION INTRAMUSCULAR; INTRAVENOUS AS NEEDED
Status: DISCONTINUED | OUTPATIENT
Start: 2018-02-23 | End: 2018-02-23 | Stop reason: SURG

## 2018-02-23 RX ORDER — PROMETHAZINE HYDROCHLORIDE 12.5 MG/1
12.5 TABLET ORAL EVERY 6 HOURS PRN
Status: DISCONTINUED | OUTPATIENT
Start: 2018-02-23 | End: 2018-02-24 | Stop reason: HOSPADM

## 2018-02-23 RX ORDER — PROMETHAZINE HYDROCHLORIDE 25 MG/ML
INJECTION, SOLUTION INTRAMUSCULAR; INTRAVENOUS AS NEEDED
Status: DISCONTINUED | OUTPATIENT
Start: 2018-02-23 | End: 2018-02-23 | Stop reason: SURG

## 2018-02-23 RX ORDER — SCOLOPAMINE TRANSDERMAL SYSTEM 1 MG/1
1 PATCH, EXTENDED RELEASE TRANSDERMAL
Status: DISCONTINUED | OUTPATIENT
Start: 2018-02-23 | End: 2018-02-23 | Stop reason: HOSPADM

## 2018-02-23 RX ORDER — BUPIVACAINE HYDROCHLORIDE 2.5 MG/ML
INJECTION, SOLUTION EPIDURAL; INFILTRATION; INTRACAUDAL
Status: COMPLETED
Start: 2018-02-23 | End: 2018-02-23

## 2018-02-23 RX ORDER — HALOPERIDOL 5 MG/ML
INJECTION INTRAMUSCULAR AS NEEDED
Status: DISCONTINUED | OUTPATIENT
Start: 2018-02-23 | End: 2018-02-23 | Stop reason: SURG

## 2018-02-23 RX ORDER — ONDANSETRON 2 MG/ML
INJECTION INTRAMUSCULAR; INTRAVENOUS
Status: COMPLETED
Start: 2018-02-23 | End: 2018-02-23

## 2018-02-23 RX ORDER — SODIUM CHLORIDE 0.9 % (FLUSH) 0.9 %
1-10 SYRINGE (ML) INJECTION AS NEEDED
Status: DISCONTINUED | OUTPATIENT
Start: 2018-02-23 | End: 2018-02-24 | Stop reason: HOSPADM

## 2018-02-23 RX ORDER — ONDANSETRON 2 MG/ML
4 INJECTION INTRAMUSCULAR; INTRAVENOUS ONCE AS NEEDED
Status: COMPLETED | OUTPATIENT
Start: 2018-02-23 | End: 2018-02-23

## 2018-02-23 RX ORDER — SUCCINYLCHOLINE CHLORIDE 20 MG/ML
INJECTION INTRAMUSCULAR; INTRAVENOUS AS NEEDED
Status: DISCONTINUED | OUTPATIENT
Start: 2018-02-23 | End: 2018-02-23 | Stop reason: SURG

## 2018-02-23 RX ORDER — CHLORHEXIDINE GLUCONATE 0.12 MG/ML
30 RINSE ORAL ONCE
Status: COMPLETED | OUTPATIENT
Start: 2018-02-23 | End: 2018-02-23

## 2018-02-23 RX ORDER — BUPIVACAINE HYDROCHLORIDE 2.5 MG/ML
INJECTION, SOLUTION EPIDURAL; INFILTRATION; INTRACAUDAL
Status: DISPENSED
Start: 2018-02-23 | End: 2018-02-23

## 2018-02-23 RX ORDER — PROCHLORPERAZINE MALEATE 10 MG
5 TABLET ORAL EVERY 6 HOURS PRN
Status: DISCONTINUED | OUTPATIENT
Start: 2018-02-23 | End: 2018-02-24 | Stop reason: HOSPADM

## 2018-02-23 RX ORDER — MEPERIDINE HYDROCHLORIDE 50 MG/ML
INJECTION INTRAMUSCULAR; INTRAVENOUS; SUBCUTANEOUS
Status: COMPLETED
Start: 2018-02-23 | End: 2018-02-23

## 2018-02-23 RX ORDER — CYANOCOBALAMIN 1000 UG/ML
1000 INJECTION, SOLUTION INTRAMUSCULAR; SUBCUTANEOUS ONCE
Status: COMPLETED | OUTPATIENT
Start: 2018-02-24 | End: 2018-02-24

## 2018-02-23 RX ORDER — PROCHLORPERAZINE 25 MG
25 SUPPOSITORY, RECTAL RECTAL EVERY 12 HOURS PRN
Status: DISCONTINUED | OUTPATIENT
Start: 2018-02-23 | End: 2018-02-24 | Stop reason: HOSPADM

## 2018-02-23 RX ORDER — LISINOPRIL 5 MG/1
5 TABLET ORAL
Status: DISCONTINUED | OUTPATIENT
Start: 2018-02-24 | End: 2018-02-24 | Stop reason: HOSPADM

## 2018-02-23 RX ORDER — BUPIVACAINE HYDROCHLORIDE 2.5 MG/ML
INJECTION, SOLUTION EPIDURAL; INFILTRATION; INTRACAUDAL AS NEEDED
Status: DISCONTINUED | OUTPATIENT
Start: 2018-02-23 | End: 2018-02-23 | Stop reason: SURG

## 2018-02-23 RX ORDER — PANTOPRAZOLE SODIUM 40 MG/10ML
40 INJECTION, POWDER, LYOPHILIZED, FOR SOLUTION INTRAVENOUS
Status: DISCONTINUED | OUTPATIENT
Start: 2018-02-24 | End: 2018-02-24 | Stop reason: HOSPADM

## 2018-02-23 RX ORDER — MIDAZOLAM HYDROCHLORIDE 1 MG/ML
INJECTION INTRAMUSCULAR; INTRAVENOUS AS NEEDED
Status: DISCONTINUED | OUTPATIENT
Start: 2018-02-23 | End: 2018-02-23 | Stop reason: SURG

## 2018-02-23 RX ORDER — DEXAMETHASONE SODIUM PHOSPHATE 4 MG/ML
INJECTION, SOLUTION INTRA-ARTICULAR; INTRALESIONAL; INTRAMUSCULAR; INTRAVENOUS; SOFT TISSUE AS NEEDED
Status: DISCONTINUED | OUTPATIENT
Start: 2018-02-23 | End: 2018-02-23 | Stop reason: SURG

## 2018-02-23 RX ORDER — MEPERIDINE HYDROCHLORIDE 50 MG/ML
25 INJECTION INTRAMUSCULAR; INTRAVENOUS; SUBCUTANEOUS
Status: DISCONTINUED | OUTPATIENT
Start: 2018-02-23 | End: 2018-02-23 | Stop reason: HOSPADM

## 2018-02-23 RX ORDER — LABETALOL HYDROCHLORIDE 5 MG/ML
10 INJECTION, SOLUTION INTRAVENOUS
Status: DISCONTINUED | OUTPATIENT
Start: 2018-02-23 | End: 2018-02-24 | Stop reason: HOSPADM

## 2018-02-23 RX ORDER — SODIUM CHLORIDE 9 MG/ML
150 INJECTION, SOLUTION INTRAVENOUS CONTINUOUS
Status: DISCONTINUED | OUTPATIENT
Start: 2018-02-23 | End: 2018-02-23 | Stop reason: HOSPADM

## 2018-02-23 RX ORDER — LORAZEPAM 2 MG/ML
0.5 INJECTION INTRAMUSCULAR
Status: DISCONTINUED | OUTPATIENT
Start: 2018-02-23 | End: 2018-02-23 | Stop reason: HOSPADM

## 2018-02-23 RX ORDER — EPINEPHRINE 1 MG/ML
INJECTION, SOLUTION, CONCENTRATE INTRAVENOUS
Status: DISPENSED
Start: 2018-02-23 | End: 2018-02-23

## 2018-02-23 RX ORDER — ONDANSETRON 4 MG/1
4 TABLET, ORALLY DISINTEGRATING ORAL EVERY 6 HOURS PRN
Status: DISCONTINUED | OUTPATIENT
Start: 2018-02-23 | End: 2018-02-24 | Stop reason: HOSPADM

## 2018-02-23 RX ORDER — NALOXONE HCL 0.4 MG/ML
0.1 VIAL (ML) INJECTION
Status: DISCONTINUED | OUTPATIENT
Start: 2018-02-23 | End: 2018-02-24 | Stop reason: HOSPADM

## 2018-02-23 RX ORDER — CLONIDINE HYDROCHLORIDE 0.1 MG/1
0.1 TABLET ORAL EVERY 6 HOURS PRN
Status: DISCONTINUED | OUTPATIENT
Start: 2018-02-23 | End: 2018-02-24 | Stop reason: HOSPADM

## 2018-02-23 RX ORDER — DIPHENHYDRAMINE HYDROCHLORIDE 50 MG/ML
INJECTION INTRAMUSCULAR; INTRAVENOUS AS NEEDED
Status: DISCONTINUED | OUTPATIENT
Start: 2018-02-23 | End: 2018-02-23 | Stop reason: SURG

## 2018-02-23 RX ORDER — FENTANYL CITRATE 50 UG/ML
INJECTION, SOLUTION INTRAMUSCULAR; INTRAVENOUS AS NEEDED
Status: DISCONTINUED | OUTPATIENT
Start: 2018-02-23 | End: 2018-02-23 | Stop reason: SURG

## 2018-02-23 RX ORDER — SIMETHICONE 80 MG
80 TABLET,CHEWABLE ORAL 4 TIMES DAILY PRN
Status: DISCONTINUED | OUTPATIENT
Start: 2018-02-23 | End: 2018-02-24 | Stop reason: HOSPADM

## 2018-02-23 RX ORDER — ONDANSETRON 2 MG/ML
4 INJECTION INTRAMUSCULAR; INTRAVENOUS EVERY 6 HOURS PRN
Status: DISCONTINUED | OUTPATIENT
Start: 2018-02-23 | End: 2018-02-24 | Stop reason: HOSPADM

## 2018-02-23 RX ORDER — HYDROCODONE BITARTRATE AND ACETAMINOPHEN 7.5; 325 MG/1; MG/1
1 TABLET ORAL EVERY 4 HOURS PRN
Status: DISCONTINUED | OUTPATIENT
Start: 2018-02-23 | End: 2018-02-24 | Stop reason: HOSPADM

## 2018-02-23 RX ORDER — PROPOFOL 10 MG/ML
INJECTION, EMULSION INTRAVENOUS AS NEEDED
Status: DISCONTINUED | OUTPATIENT
Start: 2018-02-23 | End: 2018-02-23 | Stop reason: SURG

## 2018-02-23 RX ORDER — SODIUM CHLORIDE, SODIUM LACTATE, POTASSIUM CHLORIDE, CALCIUM CHLORIDE 600; 310; 30; 20 MG/100ML; MG/100ML; MG/100ML; MG/100ML
150 INJECTION, SOLUTION INTRAVENOUS CONTINUOUS
Status: ACTIVE | OUTPATIENT
Start: 2018-02-23 | End: 2018-02-24

## 2018-02-23 RX ADMIN — BUPIVACAINE HYDROCHLORIDE 60 ML: 2.5 INJECTION, SOLUTION EPIDURAL; INFILTRATION; INTRACAUDAL; PERINEURAL at 07:45

## 2018-02-23 RX ADMIN — SUCCINYLCHOLINE CHLORIDE 140 MG: 20 INJECTION, SOLUTION INTRAMUSCULAR; INTRAVENOUS at 07:37

## 2018-02-23 RX ADMIN — FENTANYL CITRATE 100 MCG: 50 INJECTION, SOLUTION INTRAMUSCULAR; INTRAVENOUS at 09:17

## 2018-02-23 RX ADMIN — Medication 100 MCG: at 09:16

## 2018-02-23 RX ADMIN — PROMETHAZINE HYDROCHLORIDE 12.5 MG: 25 INJECTION INTRAMUSCULAR; INTRAVENOUS at 07:37

## 2018-02-23 RX ADMIN — DIPHENHYDRAMINE HYDROCHLORIDE 25 MG: 50 INJECTION INTRAMUSCULAR; INTRAVENOUS at 07:37

## 2018-02-23 RX ADMIN — SODIUM CHLORIDE 1000 ML: 9 INJECTION, SOLUTION INTRAVENOUS at 06:30

## 2018-02-23 RX ADMIN — Medication 100 MCG: at 09:32

## 2018-02-23 RX ADMIN — CHLORHEXIDINE GLUCONATE 30 ML: 1.2 RINSE ORAL at 07:19

## 2018-02-23 RX ADMIN — MEPERIDINE HYDROCHLORIDE 25 MG: 50 INJECTION, SOLUTION INTRAMUSCULAR; INTRAVENOUS; SUBCUTANEOUS at 10:22

## 2018-02-23 RX ADMIN — HYDROMORPHONE HYDROCHLORIDE 0.5 MG: 1 INJECTION, SOLUTION INTRAMUSCULAR; INTRAVENOUS; SUBCUTANEOUS at 23:29

## 2018-02-23 RX ADMIN — PROPOFOL 200 MG: 10 INJECTION, EMULSION INTRAVENOUS at 07:37

## 2018-02-23 RX ADMIN — ONDANSETRON 4 MG: 2 INJECTION INTRAMUSCULAR; INTRAVENOUS at 11:03

## 2018-02-23 RX ADMIN — Medication 100 MCG: at 09:00

## 2018-02-23 RX ADMIN — SUGAMMADEX 200 MG: 100 INJECTION, SOLUTION INTRAVENOUS at 09:56

## 2018-02-23 RX ADMIN — CEFAZOLIN SODIUM 2 G: 2 SOLUTION INTRAVENOUS at 16:37

## 2018-02-23 RX ADMIN — Medication 10 ML: at 16:57

## 2018-02-23 RX ADMIN — MEPERIDINE HYDROCHLORIDE 25 MG: 50 INJECTION INTRAMUSCULAR; INTRAVENOUS; SUBCUTANEOUS at 10:22

## 2018-02-23 RX ADMIN — SCOLOPAMINE TRANSDERMAL SYSTEM 1 PATCH: 1 PATCH, EXTENDED RELEASE TRANSDERMAL at 06:45

## 2018-02-23 RX ADMIN — PANTOPRAZOLE SODIUM 40 MG: 40 INJECTION, POWDER, FOR SOLUTION INTRAVENOUS at 06:42

## 2018-02-23 RX ADMIN — CEFAZOLIN SODIUM 2 G: 2 SOLUTION INTRAVENOUS at 07:29

## 2018-02-23 RX ADMIN — SODIUM CHLORIDE 150 ML/HR: 9 INJECTION, SOLUTION INTRAVENOUS at 07:10

## 2018-02-23 RX ADMIN — FENTANYL CITRATE 100 MCG: 50 INJECTION, SOLUTION INTRAMUSCULAR; INTRAVENOUS at 07:32

## 2018-02-23 RX ADMIN — ONDANSETRON 4 MG: 2 INJECTION INTRAMUSCULAR; INTRAVENOUS at 07:32

## 2018-02-23 RX ADMIN — Medication 100 MCG: at 08:44

## 2018-02-23 RX ADMIN — DEXAMETHASONE SODIUM PHOSPHATE 8 MG: 4 INJECTION, SOLUTION INTRAMUSCULAR; INTRAVENOUS at 07:32

## 2018-02-23 RX ADMIN — ROCURONIUM BROMIDE 5 MG: 10 INJECTION INTRAVENOUS at 07:37

## 2018-02-23 RX ADMIN — SODIUM CHLORIDE, POTASSIUM CHLORIDE, SODIUM LACTATE AND CALCIUM CHLORIDE 150 ML/HR: 600; 310; 30; 20 INJECTION, SOLUTION INTRAVENOUS at 12:53

## 2018-02-23 RX ADMIN — HALOPERIDOL LACTATE 1 MG: 5 INJECTION, SOLUTION INTRAMUSCULAR at 07:37

## 2018-02-23 RX ADMIN — SODIUM CHLORIDE: 9 INJECTION, SOLUTION INTRAVENOUS at 09:59

## 2018-02-23 RX ADMIN — SODIUM CHLORIDE: 9 INJECTION, SOLUTION INTRAVENOUS at 08:43

## 2018-02-23 RX ADMIN — HYDROMORPHONE HYDROCHLORIDE 0.5 MG: 1 INJECTION, SOLUTION INTRAMUSCULAR; INTRAVENOUS; SUBCUTANEOUS at 16:57

## 2018-02-23 RX ADMIN — PROPOFOL 50 MCG/KG/MIN: 10 INJECTION, EMULSION INTRAVENOUS at 07:42

## 2018-02-23 RX ADMIN — SCOPALAMINE 1 PATCH: 1 PATCH, EXTENDED RELEASE TRANSDERMAL at 06:45

## 2018-02-23 RX ADMIN — MIDAZOLAM HYDROCHLORIDE 1 MG: 1 INJECTION, SOLUTION INTRAMUSCULAR; INTRAVENOUS at 07:32

## 2018-02-23 RX ADMIN — ROCURONIUM BROMIDE 45 MG: 10 INJECTION INTRAVENOUS at 07:50

## 2018-02-23 RX ADMIN — PANTOPRAZOLE SODIUM 40 MG: 40 INJECTION, POWDER, LYOPHILIZED, FOR SOLUTION INTRAVENOUS at 06:42

## 2018-02-23 RX ADMIN — LIDOCAINE HYDROCHLORIDE 80 MG: 20 INJECTION, SOLUTION INTRAVENOUS at 07:37

## 2018-02-23 NOTE — PLAN OF CARE
Problem: Fall Risk (Adult)  Goal: Absence of Falls  Outcome: Ongoing (interventions implemented as appropriate)   02/23/18 1213   Fall Risk (Adult)   Absence of Falls making progress toward outcome

## 2018-02-23 NOTE — H&P (VIEW-ONLY)
[facility] BARIATRIC SURGERY  [address]  [facility] [address]  [phone]        Patient  Name:  Carine Franklin  :  1965        Date of Visit: 2018        Chief Complaint:  weight gain; unable to maintain weight loss     History of Present Illness:  Carine Franklin is a 53 y.o. female who presents today for evaluation, education and consultation regarding weight loss surgery.      Patient has been overweight for many years, with numerous failed dietary/weight loss attempts.  She now has obesity related comorbidities of Hypertension, GERD, osteoarthritis and as such has decided to pursue weight loss surgery.     She reports no changes to the medical history.  She prefers to have surgery in [place].       Review of data:     PERRY: reviewed  CBC: nl  CMP: nl  EGD: PQ 17 small HH, 38 cm, gastritis  HP negative  Cardiac clearance: low risk  Echo: n/a  Stress test: no evidence of ischemia, EF 71% ()  PFTs: normal  Pulm clearance: n/a  EKG: normal  CXR: normal        Last tobacco: n/a  Last NSAIDs: Mobic yesterday  Last ASA: 2-3 weeks ago  Last steroids: n/a  Last hormones: n/a         Medical History         Past Medical History:   Diagnosis Date   • Back pain     • Borderline diabetes     • Fatigue     • GERD (gastroesophageal reflux disease)       severe if skips daily Prevacid.  Last EGD 2016 in [place] with findings of gastritis, no HH per patient   • H. pylori infection       x 2, treated both times   • Hypertension     • Iron deficiency anemia       improved with oral MVI + iron   •  (normal spontaneous vaginal delivery)       x 4   • Osteoarthritis of multiple joints 2017          Surgical History          Past Surgical History:   Procedure Laterality Date   • LAPAROSCOPIC TUBAL LIGATION       • CT LAP,CHOLECYSTECTOMY N/A 2017     Procedure: CHOLECYSTECTOMY LAPAROSCOPIC;  Surgeon: Manoj Mendez MD;  Location: [facility]  stones                 Allergies   Allergen Reactions   • No Known Drug Allergy           Current Outpatient Prescriptions:   •  lansoprazole (PREVACID) 30 MG capsule, , Disp: , Rfl:   •  lisinopril-hydrochlorothiazide (PRINZIDE,ZESTORETIC) 10-12.5 MG per tablet, Take one half tablet once a day.,, Disp: 15 tablet, Rfl: 3  •  melatonin 5 MG tablet tablet, Take 5 mg by mouth., Disp: , Rfl:   •  meloxicam (MOBIC) 15 MG tablet, Take 1 tablet by mouth Daily. (Patient taking differently: Take 7.5 mg by mouth 2 (Two) Times a Day As Needed.), Disp: 30 tablet, Rfl: 5      Social History    Social History            Social History   • Marital status:        Spouse name: Jesse Franklin    • Number of children: 4   • Years of education: 8th grade       Occupational History   • Retired          worked for [employer]             Social History Main Topics   • Smoking status: Former Smoker       Years: 15.00       Types: Cigarettes   • Smokeless tobacco: Never Used         Comment: quit 26 years ago; Is not around secondhand smoke in house or car   • Alcohol use No   • Drug use: No   • Sexual activity: Yes       Partners: Male       Birth control/ protection: Surgical         Comment:             Other Topics Concern   • Not on file          Social History Narrative     Lives with                Family History   Problem Relation Age of Onset   • Hypertension Mother     • Obesity Mother     • Heart disease Mother     • Cancer Father     • Melanoma Father     • Diabetes Sister     • Hypertension Sister     • Obesity Sister     • Sleep apnea Sister     • Hypertension Brother     • Diabetes Sister     • Hypertension Sister     • Obesity Sister     • Hypertension Brother     • Hypertension Sister     • Obesity Sister     • Obesity Maternal Grandmother           Review of Systems:  Constitutional:  The patient reports fatigue, weight gain and denies fevers and chills.  Cardiovascular:  The patient reports HTN  and denies HLD, CP, MI, heart disease, DVT and edema.  Respiratory:  The patient reports none and denies asthma, apnea and PE.  Gastrointestinal:  The patient reports heartburn and denies pancreatitis, liver disease and IBS.  Genitourinary:  The patient denies renal insufficiency.    Musculoskeletal:  The patient reports joint pain, arthritis and denies fibromyalgia and autoimmune disease.  Neurological:  The patient reports none and denies seizure and stroke.  Psychiatric:  The patient reports none and denies anxiety, depression and bipolar disorder.  Endocrine:  The patient reports glucose intolerance and denies diabetes, thyroid disease and gout.  Hematologic:  The patient reports none and denies anemia and bleeding disorder.  Skin:  The patient denies MRSA.           Physical Exam:  Vital Signs:  Weight: 114 kg (252 lb)   Body mass index is 40.67 kg/(m^2).  Temp: 97.8 °F (36.6 °C)   Heart Rate: 62   BP: 136/79      Physical Exam   Constitutional: She is oriented to person, place, and time. She appears well-developed and well-nourished. No distress.   HENT:   Head: Normocephalic and atraumatic.   Mouth/Throat: No oropharyngeal exudate.   Eyes: Conjunctivae and EOM are normal. Pupils are equal, round, and reactive to light.   Neck: Normal range of motion. Neck supple. No tracheal deviation present. No thyromegaly present.   Cardiovascular: Normal rate, regular rhythm and normal heart sounds.    Pulmonary/Chest: Effort normal and breath sounds normal. No respiratory distress.   Abdominal: Soft. She exhibits no distension. There is no tenderness.   Scattered lap scars   Musculoskeletal: Normal range of motion. She exhibits no edema or deformity.   Neurological: She is alert and oriented to person, place, and time. No cranial nerve deficit.   Skin: Skin is warm and dry. No rash noted. She is not diaphoretic. No erythema.   Psychiatric: She has a normal mood and affect. Her behavior is normal. Judgment and thought  content normal.             Patient Active Problem List   Diagnosis   • Gallstones   • Morbid obesity   • Essential hypertension   • Gastroesophageal reflux disease without esophagitis   • Osteoarthritis of multiple joints   • Abnormal laboratory test result         Assessment:     Carine Franklin is a 53 y.o. year old female with medically complicated obesity.     Weight loss surgery is deemed medically necessary given the following obesity related comorbidities including Hypertension, GERD, osteoarthritis with current Weight: 114 kg (252 lb) and Body mass index is 40.67 kg/(m^2)..     Patient is aware that surgery is a tool, and that weight loss is not guaranteed but only seen in the context of appropriate use, follow up and exercise.     The patient was present for an approximately a 2.5 hour discussion of the purpose of weight loss surgery, how WLS is a tool to assist in achieving weight loss goals, the most common complications and how best to avoid them, and the strategies for short and long term weight loss.  Ample opportunity to discuss questions was available both in group and during the time of individual examination.     I reviewed all available preop labs, Xrays, tests, clearances, etc and signed off on these in the record.  All of this in addition to the patient's unique history and exam has been taken into consideration in determining their appropriate candidacy for weight loss surgery.     Complications  of laparoscopic/possible robotic gastric sleeve were discussed. The patient is well aware of the potential complications of surgery that include but not limited to bleeding, infections, deep venous thrombosis, pulmonary embolism, pulmonary complications such as pneumonia, cardiac events, hernias, small bowel obstruction, damage to the spleen or other organs, bowel injury, disfiguring scars, failure to lose weight, need for additional surgery, conversion to an open procedure, and death. Patient is  "also aware of complications which apply in this particular procedure that can include but are not limited to a \"leak\" at the staple line which in some instances may require conversion to gastric bypass.     The patient is aware if a hiatal hernia is encountered, it likely will be repaired.  R/B/A Rx to hiatal hernia repair were discussed as outlined in our long consent form.  Briefly risks in addition to those for LSG include recurrent hernia, ROBYN, dysphagia, esophageal injury, pneumothorax, injury to the vagus nerves, injury to the thoracic duct, aorta or vena cava.     Greater than 3 minutes was spent with the patient discussing avoiding all tobacco products and second hand smoke at least 2 weeks pre-operatively and 6 weeks post-operatively to minimize the risk of sleeve leak.  This included discussing the importance of avoiding even secondhand smoke as the risk of leak is increased.  Examples discussed:  I made it very clear that the patient understands they should avoid even riding in a car where someone has previously smoked in the last 2 weeks, living in a house where someone smokes (even if it's in a separate room/patio/attached garage, etc.) we discussed that they should not have a conversation with a group of people who are smoking even if it's outside.  They can be around wood burning fires and barbecue.  I told them I do not know if marijuana has a same effects but my overall recommendation is to avoid it for 2 weeks prior in 6 weeks after surgery.  They also are aware that nicotine may also increase the risk of leak and I strongly encouraged him to avoid that as well for 2 weeks prior in 6 weeks after surgery.     Discussed the risks, benefits and alternative therapies at great length as outlined in our extensive consent forms, consent videos, and educational teaching process under the direction of the center's .     A copy of the patient's signed informed consent is on file.     Plan: "  Laparoscopic sleeve gastrectomy + hiatal hernia repair.           Dana Brennan MD

## 2018-02-23 NOTE — BRIEF OP NOTE
GASTRIC SLEEVE LAPAROSCOPIC, HIATAL HERNIA REPAIR LAPAROSCOPIC, ESOPHAGOGASTRODUODENOSCOPY  Progress Note    Carine Ivantheo  2/23/2018    Pre-op Diagnosis:   Obesity, Class III, BMI 40-49.9 (morbid obesity) [E66.01]       Post-Op Diagnosis Codes:     * Obesity, Class III, BMI 40-49.9 (morbid obesity) [E66.01]    Procedure/CPT® Codes:      Procedure(s):  GASTRIC SLEEVE LAPAROSCOPIC  HIATAL HERNIA REPAIR LAPAROSCOPIC  ESOPHAGOGASTRODUODENOSCOPY    Surgeon(s):  Dana Brennan MD    Anesthesia: General with Block    Staff:   Circulator: Christi So RN  Scrub Person: Ramirez Hernandez    Estimated Blood Loss: 50 mL    Urine Voided: * No values recorded between 2/23/2018 12:00 AM and 2/23/2018  7:23 AM *    Specimens:                A: subtotal gastrectomy      Drains:           Findings: normal liver, small hiatal hernia    Complications: none immediate      Dana Brennan MD     Date: 2/23/2018  Time: 7:23 AM

## 2018-02-23 NOTE — ANESTHESIA PREPROCEDURE EVALUATION
Anesthesia Evaluation     Patient summary reviewed and Nursing notes reviewed   history of anesthetic complications: PONV  NPO Solid Status: > 8 hours  NPO Liquid Status: > 8 hours           Airway   Mallampati: II  TM distance: <3 FB  Neck ROM: full  possible difficult intubation  Dental - normal exam   (+) upper dentures and lower dentures    Pulmonary - normal exam   (+) a smoker Former,   Cardiovascular - normal exam    ECG reviewed  Rhythm: regular  Rate: normal    (+) hypertension,       Neuro/Psych  GI/Hepatic/Renal/Endo    (+) obesity,  GERD,     Musculoskeletal     (+) back pain,   Abdominal  - normal exam  (+) obese,     Abdomen: soft.  Bowel sounds: normal.   Substance History      OB/GYN          Other   (+) arthritis                     Anesthesia Plan    ASA 3     general   (Risks and benefits discussed including risk of aspiration, recall and dental damage. All patient questions answered. Will continue with POC.    GETA with RSI    B/L TAP blocks intraoperatively  Risk vs Benefits discussed with patient to include infection, bleeding at the site, paresthesia, and incomplete analgesia.  )  intravenous induction   Anesthetic plan and risks discussed with patient.

## 2018-02-23 NOTE — OP NOTE
OPERATIVE REPORT    DATE: 02/23/18    PATIENT: Carine Franklin    PREOPERATIVE DIAGNOSIS:    1. Obesity with multiple comorbidities  2.  Hiatal hernia       POSTOPERATIVE DIAGNOSIS:    1. Obesity with multiple comorbidities  2.  Hiatal hernia     PROCEDURES PERFORMED:  1. Laparoscopic sleeve gastrectomy (85% subtotal vertical gastrectomy) over a  36-[place] bougie dilator.   2.  Esophagogastroduodenoscopy  3.  Hiatal hernia repair     SURGEON:  Dana Brennan M.D.    ANESTHESIA:  General endotracheal with MARILYN block    ESTIMATED BLOOD LOSS:   50 mL    FLUIDS:  Crystalloids.    SPECIMENS:  Subtotal gastrectomy.    DRAINS:  None.    COUNTS:  Correct.    COMPLICATIONS:  None.    FINDINGS: normal liver, small hiatal hernia    INDICATIONS:   Carine Franklin is a 53 y.o. year old female with morbid obesity and associated comorbidities who presents for elective laparoscopic sleeve gastrectomy. The patient has has undergone our extensive preoperative education, teaching, and consent process. Everything is in order and they wish to proceed. Additionally, she had preoperative EGD showing hiatal hernia, and plan is to fix this today also.    DESCRIPTION OF PROCEDURE:   The patient was brought to the operating room and placed supine upon the operating room table. SCDs were placed. The patient underwent uneventful general endotracheal anesthesia and bilateral MARILYN blocks per the anesthesiology staff.  She received subcutaneous Lovenox and was given 2 g Ancef. The abdomen was prepped with ChloraPrep and draped in the usual sterile fashion. An Ioban was used as well.  Timeout was performed.       A small transverse incision was made a few centimeters above and to the left of the umbilicus and the peritoneal cavity entered under direct camera visualization using a 5 mm 0° laparoscope and an Optiview trocar. The abdomen was then insufflated to a pressure of 16 mmHg with CO2 gas. Exploratory laparoscopy revealed no evidence  of injury from the entrance technique. There was some rectus diastasis and weakness at the umbilicus.  The liver had a uniform capsule and was moderate in size without evidence of gross hepatomegaly or fibrosis.  A 5 mm port was placed in the right mid abdomen laterally and a 15 mm port was placed just left of the umbilicus.  A 5 mm port was placed lateral and to the left to the first port and a 5 mm port was placed a handsbreadth lateral to that on the left.  A Deborah liver retractor was placed through a small subxiphoid stab incision and the the left lobe of the liver was elevated.       The stomach was decompressed with an orogastric tube, which was then withdrawn back into the esophagus. The greater curvature vessels were taken down with a Harmonic scalpel beginning at the midpoint of the stomach and continued up to the angle of His, including the short gastrics. The left bibi was completely exposed and there was a small hiatal hernia here/  This was photodocumented. The GE junction fat pad was elevated to make a clear landing zone for the stapler later. The greater curvature vessels were taken down with the Harmonic scalpel extending distally within 3 cm of the pylorus. Filmy adhesions to the stomach were taken down posteriorly.    I incised the hernia sac from the left bibi of the diaphragm.  I incised the phrenoesophageal ligament with Harmonic scalpel. The pars flaccida was opened (there was no replaced hepatic vessel) and the right bibi was exposed.  I incised the hernia sac from the right bibi.  An instrument was passed under the esophagus at the base of the hiatus and brought easily out the other side.  A penrose drain was placed around the esophagus for retraction.  The esophagus was mobilized into the abdomen 5 cm.  A posterior cruroplasty was performed with three 0 silk stitches.  The crura came together without tension and repair was photodocumented.  The vagus nerves were identified and protected.  The penrose was removed.  The time to repair the hernia was documented and was 26 minutes.      The OGT was removed, and the 36 [place] bougie dilator was passed transorally and positioned along the lesser curvature of the stomach with the tip at the pylorus. Using the bougie as template, a sleeve gastrectomy was performed with an articulating 60 mm Manito stapler bolstered by single Pennie-strips. The first load was black, and the rest of the loads (5) were green.The bougie was removed before the last staple load was fired. Care was taken to stay 1 cm away from the esophagus to prevent any esophagus fibers from being divided. The staple line was examined. There were several points of bleeding that required cautery for hemostasis. The gastrectomy specimen was removed through the 15 mm port site in a specimen bag. The specimen was later examined, and the staples were well-formed. Palpation of the specimen revealed no masses. The staple line of the sleeve gastrectomy revealed staples that were well-formed.      The flexible endoscope was passed transorally down to the pylorus easily. The sleeve extended to within 2-3 cm proximal to the pylorus and was hemostatic. The sleeve was not narrow or twisted. There was no persistent hiatal hernia or distal esophagitis. The rest of the esophagus was normal. The scope was removed. The leak test was normal.    I rescrubbed and suctioned the irrigation fluid from the upper abdomen, making sure it was dry. The staple line on the stomach was hemostatic. There was an area between the first and second staple fires that was trying to twist a little, and so this area was sutured to the cut edge of the omentum with 2-0 vicryl.  After tacking it down, the stomach lay nicely.  The staple line was treated with 4 ml of aerosolized Tisseel fibrin glue.  The liver retractor was removed easily.      The 15 mm port was removed under direct visualization and there was no bleeding. This incision was  closed with an 0-vicryl transfascial suture using a suture passer under direct visualization in a horizontal mattress fashion. All other ports were removed and then replaced under direct visualization and all of these were hemostatic. The instruments were removed and the abdomen was desufflated. The ports were removed. A 2-0 vicryl was used to close the subcutaneous tissue in the 15 mm port site. 3-0 monocryl plus was used to close skin incisions with interrupted sutures. Skin Affix skin glue was placed. The patient was awakened and taken to recovery in good condition, having tolerated the procedure well. All sponge, needle, and instrument counts were correct.

## 2018-02-23 NOTE — ANESTHESIA PROCEDURE NOTES
Peripheral Block    Patient location during procedure: OR  Start time: 2/23/2018 7:40 AM  Stop time: 2/23/2018 7:45 AM  Reason for block: at surgeon's request and post-op pain management  Performed by  CRNA: WILFRID GARCIA  Preanesthetic Checklist  Completed: patient identified, site marked, surgical consent, pre-op evaluation, timeout performed, IV checked, risks and benefits discussed and monitors and equipment checked  Prep:  Pt Position: supine  Sterile barriers:gloves, cap, sterile barriers and mask  Prep: ChloraPrep  Patient monitoring: blood pressure monitoring, continuous pulse oximetry and EKG  Procedure  Performed under: general  Guidance:ultrasound guided  Images:still images obtained    Laterality:Bilateral  Block Type:TAP  Injection Technique:single-shot  Needle Type:echogenic and Tuohy  Needle Gauge:20 G  Resistance on Injection: none  Medications  Comment:Block Injection: LA dose divided between Right and Left Block  Adjuncts per total volume of LA:    NONE      If required, intravenous sedation was given -- see meds on anesthesia record.  Local Injected:bupivacaine 0.25% Local Amount Injected:60mL  Post Assessment  Injection Assessment: negative aspiration for heme, no paresthesia on injection and incremental injection  Patient Tolerance:comfortable throughout block  Complications:no  Additional Notes  Procedure:      BILATERAL TAP BLOCKS                             Patient analgesia was achieved with General Anesthesia    The pt was placed in the Supine Position and under Ultrasound guidance, an echogenic or touhy needle was advanced with Normal Saline hydro dissection of tissue.  The Internal Oblique and Transversus Abdominus muscles were visualized.  At or before the aponeurosis of Internal Oblique, the local anesthetic spread was visualized in the Transversus Abdominus Plane. Injection was made incrementally with aspiration every 5 mls.  There was no intravascular injection;  injection pressure  was normal; there was no neural injection; and the procedure was completed without difficulty.

## 2018-02-23 NOTE — ANESTHESIA POSTPROCEDURE EVALUATION
Patient: Carine Franklin    Procedure Summary     Date Anesthesia Start Anesthesia Stop Room / Location    02/23/18 0729  [facility] OR 5 /  [facility]       Procedure Diagnosis Surgeon Provider    GASTRIC SLEEVE LAPAROSCOPIC (N/A Abdomen); HIATAL HERNIA REPAIR LAPAROSCOPIC (N/A Abdomen); ESOPHAGOGASTRODUODENOSCOPY (N/A Esophagus) Obesity, Class III, BMI 40-49.9 (morbid obesity)  (Obesity, Class III, BMI 40-49.9 (morbid obesity) [E66.01]) MD Andrew Rasheed CRNA          Anesthesia Type: general  Last vitals  BP   11\53   Temp   97   Pulse   76   Resp   15   SpO2   99     Post Anesthesia Care and Evaluation    Patient location during evaluation: PACU  Patient participation: complete - patient participated  Level of consciousness: awake and alert  Pain score: 3  Pain management: satisfactory to patient  Airway patency: patent  Anesthetic complications: No anesthetic complications  PONV Status: none  Cardiovascular status: acceptable and stable  Respiratory status: acceptable and face mask  Hydration status: acceptable

## 2018-02-23 NOTE — PLAN OF CARE
Problem: Patient Care Overview (Adult)  Goal: Plan of Care Review  Outcome: Ongoing (interventions implemented as appropriate)   02/23/18 1135   Coping/Psychosocial Response Interventions   Plan Of Care Reviewed With patient   Patient Care Overview   Progress progress toward functional goals as expected   Outcome Evaluation   Outcome Summary/Follow up Plan PACU DISCHARGE CRITERIA MET,.

## 2018-02-23 NOTE — PLAN OF CARE
Problem: Perioperative Period (Adult)  Goal: Signs and Symptoms of Listed Potential Problems Will be Absent or Manageable (Perioperative Period)  Outcome: Ongoing (interventions implemented as appropriate)   02/23/18 0617   Perioperative Period   Problems Assessed (Perioperative Period) all   Problems Present (Perioperative Period) none

## 2018-02-23 NOTE — PLAN OF CARE
Problem: Pain, Acute (Adult)  Goal: Acceptable Pain Control/Comfort Level  Outcome: Ongoing (interventions implemented as appropriate)   02/23/18 1214   Pain, Acute (Adult)   Acceptable Pain Control/Comfort Level making progress toward outcome

## 2018-02-23 NOTE — PLAN OF CARE
Problem: Skin Integrity Impairment, Risk/Actual (Adult)  Goal: Skin Integrity/Wound Healing  Outcome: Ongoing (interventions implemented as appropriate)   02/23/18 1213   Skin Integrity Impairment, Risk/Actual (Adult)   Skin Integrity/Wound Healing making progress toward outcome

## 2018-02-23 NOTE — ANESTHESIA PROCEDURE NOTES
Airway  Urgency: elective    Airway not difficult    General Information and Staff    Patient location during procedure: OR  CRNA: TRISTEN HERNANDEZ    Indications and Patient Condition  Indications for airway management: airway protection    Preoxygenated: yes  Mask difficulty assessment: 0 - not attempted    Final Airway Details  Final airway type: endotracheal airway      Successful airway: ETT  Cuffed: yes   Successful intubation technique: direct laryngoscopy  Facilitating devices/methods: intubating stylet  Endotracheal tube insertion site: oral  Blade: Cifuentes  Blade size: #2  ETT size: 7.0 mm  Cormack-Lehane Classification: grade I - full view of glottis  Placement verified by: chest auscultation and capnometry   Measured from: lips  ETT to lips (cm): 21  Number of attempts at approach: 1    Additional Comments  Dentition and Lips as preoperative assesment

## 2018-02-23 NOTE — PAYOR COMM NOTE
"TO:WELLCARE  FROM:NICOLE SKELTON RN PHONE [phone]  INPT CLINICALS    Lisa Golden (53 y.o. Female)     Date of Birth Social Security Number Address Home Phone MRN    1965  [address]  [facility] [address] [phone] [id number]    Jainism Marital Status          Advent        Admission Date Admission Type Admitting Provider Attending Provider Department, Room/Bed    18 Elective Dana Brennan MD Quintero, Paige Nealy, MD [facility] MED SURG  3, 311/1    Discharge Date Discharge Disposition Discharge Destination                      Attending Provider: Dana Brennan MD     Allergies:  No Known Drug Allergy    Isolation:  None   Infection:  None   Code Status:  FULL    Ht:  167.6 cm (66\")   Wt:  112 kg (247 lb)    Admission Cmt:  None   Principal Problem:  Obesity, Class III, BMI 40-49.9 (morbid obesity) [E66.01] More...                 Active Insurance as of 2018     Primary Coverage     Payor Plan Insurance Group Employer/Plan Group    [employer] [place] [employer]ICA[employer]      Payor Plan Address Payor Plan Phone Number Effective From Effective To    [address] [address] [phone] 2017     [place] [address]       Subscriber Name Subscriber Birth Date Member ID       LISA GOLDEN 1965 [id number]                 Emergency Contacts      (Rel.) Home Phone Work Phone Mobile Phone    Delfin Golden (Spouse) [phone] -- [phone]    Gustavo Summers (Daughter) [phone] -- --    Chris Ozuna (Daughter) [phone] -- --               History & Physical      Dana Brennan MD at 2018 11:15 AM          [facility] BARIATRIC SURGERY  [address]  [facility] [id number] [phone]        Patient  Name:  Lisa Golden  :  1965        Date of Visit: 2018        Chief Complaint:  weight gain; unable to maintain weight loss     History of Present " Illness:  Carine Franklin is a 53 y.o. female who presents today for evaluation, education and consultation regarding weight loss surgery.      Patient has been overweight for many years, with numerous failed dietary/weight loss attempts.  She now has obesity related comorbidities of Hypertension, GERD, osteoarthritis and as such has decided to pursue weight loss surgery.     She reports no changes to the medical history.  She prefers to have surgery in [place].       Review of data:     PERRY: reviewed  CBC: nl  CMP: nl  EGD:  17 small HH, 38 cm, gastritis  HP negative  Cardiac clearance: low risk  Echo: n/a  Stress test: no evidence of ischemia, EF 71% (2013)  PFTs: normal  Pulm clearance: n/a  EKG: normal  CXR: normal        Last tobacco: n/a  Last NSAIDs: Mobic yesterday  Last ASA: 2-3 weeks ago  Last steroids: n/a  Last hormones: n/a         Medical History         Past Medical History:   Diagnosis Date   • Back pain     • Borderline diabetes     • Fatigue     • GERD (gastroesophageal reflux disease)       severe if skips daily Prevacid.  Last EGD 2016 in [place] with findings of gastritis, no HH per patient   • H. pylori infection       x 2, treated both times   • Hypertension     • Iron deficiency anemia       improved with oral MVI + iron   •  (normal spontaneous vaginal delivery)       x 4   • Osteoarthritis of multiple joints 2017          Surgical History          Past Surgical History:   Procedure Laterality Date   • LAPAROSCOPIC TUBAL LIGATION       • [place] LAP,CHOLECYSTECTOMY N/A 2017     Procedure: CHOLECYSTECTOMY LAPAROSCOPIC;  Surgeon: Manoj Mendez MD;  Location: [facility], for stones                 Allergies   Allergen Reactions   • No Known Drug Allergy           Current Outpatient Prescriptions:   •  lansoprazole (PREVACID) 30 MG capsule, , Disp: , Rfl:   •  lisinopril-hydrochlorothiazide (PRINZIDE,ZESTORETIC) 10-12.5 MG per tablet, Take one half tablet once a  day.,, Disp: 15 tablet, Rfl: 3  •  melatonin 5 MG tablet tablet, Take 5 mg by mouth., Disp: , Rfl:   •  meloxicam (MOBIC) 15 MG tablet, Take 1 tablet by mouth Daily. (Patient taking differently: Take 7.5 mg by mouth 2 (Two) Times a Day As Needed.), Disp: 30 tablet, Rfl: 5      Social History    Social History            Social History   • Marital status:        Spouse name: Jesse Franklin    • Number of children: 4   • Years of education: 8th grade             Occupational History   • Retired          worked for [employer]             Social History Main Topics   • Smoking status: Former Smoker       Years: 15.00       Types: Cigarettes   • Smokeless tobacco: Never Used         Comment: quit 26 years ago; Is not around secondhand smoke in house or car   • Alcohol use No   • Drug use: No   • Sexual activity: Yes       Partners: Male       Birth control/ protection: Surgical         Comment:             Other Topics Concern   • Not on file          Social History Narrative     Lives with                Family History   Problem Relation Age of Onset   • Hypertension Mother     • Obesity Mother     • Heart disease Mother     • Cancer Father     • Melanoma Father     • Diabetes Sister     • Hypertension Sister     • Obesity Sister     • Sleep apnea Sister     • Hypertension Brother     • Diabetes Sister     • Hypertension Sister     • Obesity Sister     • Hypertension Brother     • Hypertension Sister     • Obesity Sister     • Obesity Maternal Grandmother           Review of Systems:  Constitutional:  The patient reports fatigue, weight gain and denies fevers and chills.  Cardiovascular:  The patient reports HTN and denies HLD, CP, MI, heart disease, DVT and edema.  Respiratory:  The patient reports none and denies asthma, apnea and PE.  Gastrointestinal:  The patient reports heartburn and denies pancreatitis, liver disease and IBS.  Genitourinary:  The patient denies renal insufficiency.     Musculoskeletal:  The patient reports joint pain, arthritis and denies fibromyalgia and autoimmune disease.  Neurological:  The patient reports none and denies seizure and stroke.  Psychiatric:  The patient reports none and denies anxiety, depression and bipolar disorder.  Endocrine:  The patient reports glucose intolerance and denies diabetes, thyroid disease and gout.  Hematologic:  The patient reports none and denies anemia and bleeding disorder.  Skin:  The patient denies MRSA.           Physical Exam:  Vital Signs:  Weight: 114 kg (252 lb)   Body mass index is 40.67 kg/(m^2).  Temp: 97.8 °F (36.6 °C)   Heart Rate: 62   BP: 136/79      Physical Exam   Constitutional: She is oriented to person, place, and time. She appears well-developed and well-nourished. No distress.   HENT:   Head: Normocephalic and atraumatic.   Mouth/Throat: No oropharyngeal exudate.   Eyes: Conjunctivae and EOM are normal. Pupils are equal, round, and reactive to light.   Neck: Normal range of motion. Neck supple. No tracheal deviation present. No thyromegaly present.   Cardiovascular: Normal rate, regular rhythm and normal heart sounds.    Pulmonary/Chest: Effort normal and breath sounds normal. No respiratory distress.   Abdominal: Soft. She exhibits no distension. There is no tenderness.   Scattered lap scars   Musculoskeletal: Normal range of motion. She exhibits no edema or deformity.   Neurological: She is alert and oriented to person, place, and time. No cranial nerve deficit.   Skin: Skin is warm and dry. No rash noted. She is not diaphoretic. No erythema.   Psychiatric: She has a normal mood and affect. Her behavior is normal. Judgment and thought content normal.             Patient Active Problem List   Diagnosis   • Gallstones   • Morbid obesity   • Essential hypertension   • Gastroesophageal reflux disease without esophagitis   • Osteoarthritis of multiple joints   • Abnormal laboratory test result  "        Assessment:     Carine Franklin is a 53 y.o. year old female with medically complicated obesity.     Weight loss surgery is deemed medically necessary given the following obesity related comorbidities including Hypertension, GERD, osteoarthritis with current Weight: 114 kg (252 lb) and Body mass index is 40.67 kg/(m^2)..     Patient is aware that surgery is a tool, and that weight loss is not guaranteed but only seen in the context of appropriate use, follow up and exercise.     The patient was present for an approximately a 2.5 hour discussion of the purpose of weight loss surgery, how WLS is a tool to assist in achieving weight loss goals, the most common complications and how best to avoid them, and the strategies for short and long term weight loss.  Ample opportunity to discuss questions was available both in group and during the time of individual examination.     I reviewed all available preop labs, Xrays, tests, clearances, etc and signed off on these in the record.  All of this in addition to the patient's unique history and exam has been taken into consideration in determining their appropriate candidacy for weight loss surgery.     Complications  of laparoscopic/possible robotic gastric sleeve were discussed. The patient is well aware of the potential complications of surgery that include but not limited to bleeding, infections, deep venous thrombosis, pulmonary embolism, pulmonary complications such as pneumonia, cardiac events, hernias, small bowel obstruction, damage to the spleen or other organs, bowel injury, disfiguring scars, failure to lose weight, need for additional surgery, conversion to an open procedure, and death. Patient is also aware of complications which apply in this particular procedure that can include but are not limited to a \"leak\" at the staple line which in some instances may require conversion to gastric bypass.     The patient is aware if a hiatal hernia is " encountered, it likely will be repaired.  R/B/A Rx to hiatal hernia repair were discussed as outlined in our long consent form.  Briefly risks in addition to those for LSG include recurrent hernia, ROBYN, dysphagia, esophageal injury, pneumothorax, injury to the vagus nerves, injury to the thoracic duct, aorta or vena cava.     Greater than 3 minutes was spent with the patient discussing avoiding all tobacco products and second hand smoke at least 2 weeks pre-operatively and 6 weeks post-operatively to minimize the risk of sleeve leak.  This included discussing the importance of avoiding even secondhand smoke as the risk of leak is increased.  Examples discussed:  I made it very clear that the patient understands they should avoid even riding in a car where someone has previously smoked in the last 2 weeks, living in a house where someone smokes (even if it's in a separate room/patio/attached garage, etc.) we discussed that they should not have a conversation with a group of people who are smoking even if it's outside.  They can be around wood burning fires and barbecue.  I told them I do not know if marijuana has a same effects but my overall recommendation is to avoid it for 2 weeks prior in 6 weeks after surgery.  They also are aware that nicotine may also increase the risk of leak and I strongly encouraged him to avoid that as well for 2 weeks prior in 6 weeks after surgery.     Discussed the risks, benefits and alternative therapies at great length as outlined in our extensive consent forms, consent videos, and educational teaching process under the direction of the center's .     A copy of the patient's signed informed consent is on file.     Plan:  Laparoscopic sleeve gastrectomy + hiatal hernia repair.           Dana Brennan MD          Electronically signed by Dana Brennan MD at 2/12/2018 12:28 PM      Dana Brennan MD at 2/23/2018  7:22 AM            H&P reviewed.  The patient was examined and there are no changes to the H&P.           Electronically signed by Dana Brennan MD at 2018  7:23 AM    Source Note             [facility] BARIATRIC SURGERY  [address]  [facility] [address]  [phone]        Patient  Name:  Carine Franklin  :  1965        Date of Visit: 2018        Chief Complaint:  weight gain; unable to maintain weight loss     History of Present Illness:  Carine Franklin is a 53 y.o. female who presents today for evaluation, education and consultation regarding weight loss surgery.      Patient has been overweight for many years, with numerous failed dietary/weight loss attempts.  She now has obesity related comorbidities of Hypertension, GERD, osteoarthritis and as such has decided to pursue weight loss surgery.     She reports no changes to the medical history.  She prefers to have surgery in [place].       Review of data:     PERRY: reviewed  CBC: nl  CMP: nl  EGD: PQ 17 small HH, 38 cm, gastritis  HP negative  Cardiac clearance: low risk  Echo: n/a  Stress test: no evidence of ischemia, EF 71% ()  PFTs: normal  Pulm clearance: n/a  EKG: normal  CXR: normal        Last tobacco: n/a  Last NSAIDs: Mobic yesterday  Last ASA: 2-3 weeks ago  Last steroids: n/a  Last hormones: n/a         Medical History         Past Medical History:   Diagnosis Date   • Back pain     • Borderline diabetes     • Fatigue     • GERD (gastroesophageal reflux disease)       severe if skips daily Prevacid.  Last EGD  in [place] with findings of gastritis, no HH per patient   • H. pylori infection       x 2, treated both times   • Hypertension     • Iron deficiency anemia       improved with oral MVI + iron   •  (normal spontaneous vaginal delivery)       x 4   • Osteoarthritis of multiple joints 2017          Surgical History          Past Surgical History:   Procedure Laterality Date   •  LAPAROSCOPIC TUBAL LIGATION       • [place] LAP,CHOLECYSTECTOMY N/A 1/20/2017     Procedure: CHOLECYSTECTOMY LAPAROSCOPIC;  Surgeon: Manoj Mendez MD;  Location: [facility], for stones                 Allergies   Allergen Reactions   • No Known Drug Allergy           Current Outpatient Prescriptions:   •  lansoprazole (PREVACID) 30 MG capsule, , Disp: , Rfl:   •  lisinopril-hydrochlorothiazide (PRINZIDE,ZESTORETIC) 10-12.5 MG per tablet, Take one half tablet once a day.,, Disp: 15 tablet, Rfl: 3  •  melatonin 5 MG tablet tablet, Take 5 mg by mouth., Disp: , Rfl:   •  meloxicam (MOBIC) 15 MG tablet, Take 1 tablet by mouth Daily. (Patient taking differently: Take 7.5 mg by mouth 2 (Two) Times a Day As Needed.), Disp: 30 tablet, Rfl: 5      Social History    Social History            Social History   • Marital status:        Spouse name: Jesse Franklin    • Number of children: 4   • Years of education: 8th grade             Occupational History   • Retired          worked for [employer]             Social History Main Topics   • Smoking status: Former Smoker       Years: 15.00       Types: Cigarettes   • Smokeless tobacco: Never Used         Comment: quit 26 years ago; Is not around secondhand smoke in house or car   • Alcohol use No   • Drug use: No   • Sexual activity: Yes       Partners: Male       Birth control/ protection: Surgical         Comment:             Other Topics Concern   • Not on file          Social History Narrative     Lives with                Family History   Problem Relation Age of Onset   • Hypertension Mother     • Obesity Mother     • Heart disease Mother     • Cancer Father     • Melanoma Father     • Diabetes Sister     • Hypertension Sister     • Obesity Sister     • Sleep apnea Sister     • Hypertension Brother     • Diabetes Sister     • Hypertension Sister     • Obesity Sister     • Hypertension Brother     • Hypertension Sister     • Obesity Sister     •  Obesity Maternal Grandmother           Review of Systems:  Constitutional:  The patient reports fatigue, weight gain and denies fevers and chills.  Cardiovascular:  The patient reports HTN and denies HLD, CP, MI, heart disease, DVT and edema.  Respiratory:  The patient reports none and denies asthma, apnea and PE.  Gastrointestinal:  The patient reports heartburn and denies pancreatitis, liver disease and IBS.  Genitourinary:  The patient denies renal insufficiency.    Musculoskeletal:  The patient reports joint pain, arthritis and denies fibromyalgia and autoimmune disease.  Neurological:  The patient reports none and denies seizure and stroke.  Psychiatric:  The patient reports none and denies anxiety, depression and bipolar disorder.  Endocrine:  The patient reports glucose intolerance and denies diabetes, thyroid disease and gout.  Hematologic:  The patient reports none and denies anemia and bleeding disorder.  Skin:  The patient denies MRSA.           Physical Exam:  Vital Signs:  Weight: 114 kg (252 lb)   Body mass index is 40.67 kg/(m^2).  Temp: 97.8 °F (36.6 °C)   Heart Rate: 62   BP: 136/79      Physical Exam   Constitutional: She is oriented to person, place, and time. She appears well-developed and well-nourished. No distress.   HENT:   Head: Normocephalic and atraumatic.   Mouth/Throat: No oropharyngeal exudate.   Eyes: Conjunctivae and EOM are normal. Pupils are equal, round, and reactive to light.   Neck: Normal range of motion. Neck supple. No tracheal deviation present. No thyromegaly present.   Cardiovascular: Normal rate, regular rhythm and normal heart sounds.    Pulmonary/Chest: Effort normal and breath sounds normal. No respiratory distress.   Abdominal: Soft. She exhibits no distension. There is no tenderness.   Scattered lap scars   Musculoskeletal: Normal range of motion. She exhibits no edema or deformity.   Neurological: She is alert and oriented to person, place, and time. No cranial nerve  deficit.   Skin: Skin is warm and dry. No rash noted. She is not diaphoretic. No erythema.   Psychiatric: She has a normal mood and affect. Her behavior is normal. Judgment and thought content normal.             Patient Active Problem List   Diagnosis   • Gallstones   • Morbid obesity   • Essential hypertension   • Gastroesophageal reflux disease without esophagitis   • Osteoarthritis of multiple joints   • Abnormal laboratory test result         Assessment:     Carine Franklin is a 53 y.o. year old female with medically complicated obesity.     Weight loss surgery is deemed medically necessary given the following obesity related comorbidities including Hypertension, GERD, osteoarthritis with current Weight: 114 kg (252 lb) and Body mass index is 40.67 kg/(m^2)..     Patient is aware that surgery is a tool, and that weight loss is not guaranteed but only seen in the context of appropriate use, follow up and exercise.     The patient was present for an approximately a 2.5 hour discussion of the purpose of weight loss surgery, how WLS is a tool to assist in achieving weight loss goals, the most common complications and how best to avoid them, and the strategies for short and long term weight loss.  Ample opportunity to discuss questions was available both in group and during the time of individual examination.     I reviewed all available preop labs, Xrays, tests, clearances, etc and signed off on these in the record.  All of this in addition to the patient's unique history and exam has been taken into consideration in determining their appropriate candidacy for weight loss surgery.     Complications  of laparoscopic/possible robotic gastric sleeve were discussed. The patient is well aware of the potential complications of surgery that include but not limited to bleeding, infections, deep venous thrombosis, pulmonary embolism, pulmonary complications such as pneumonia, cardiac events, hernias, small bowel  "obstruction, damage to the spleen or other organs, bowel injury, disfiguring scars, failure to lose weight, need for additional surgery, conversion to an open procedure, and death. Patient is also aware of complications which apply in this particular procedure that can include but are not limited to a \"leak\" at the staple line which in some instances may require conversion to gastric bypass.     The patient is aware if a hiatal hernia is encountered, it likely will be repaired.  R/B/A Rx to hiatal hernia repair were discussed as outlined in our long consent form.  Briefly risks in addition to those for LSG include recurrent hernia, ROBYN, dysphagia, esophageal injury, pneumothorax, injury to the vagus nerves, injury to the thoracic duct, aorta or vena cava.     Greater than 3 minutes was spent with the patient discussing avoiding all tobacco products and second hand smoke at least 2 weeks pre-operatively and 6 weeks post-operatively to minimize the risk of sleeve leak.  This included discussing the importance of avoiding even secondhand smoke as the risk of leak is increased.  Examples discussed:  I made it very clear that the patient understands they should avoid even riding in a car where someone has previously smoked in the last 2 weeks, living in a house where someone smokes (even if it's in a separate room/patio/attached garage, etc.) we discussed that they should not have a conversation with a group of people who are smoking even if it's outside.  They can be around wood burning fires and barbecue.  I told them I do not know if marijuana has a same effects but my overall recommendation is to avoid it for 2 weeks prior in 6 weeks after surgery.  They also are aware that nicotine may also increase the risk of leak and I strongly encouraged him to avoid that as well for 2 weeks prior in 6 weeks after surgery.     Discussed the risks, benefits and alternative therapies at great length as outlined in our extensive " "consent forms, consent videos, and educational teaching process under the direction of the center's .     A copy of the patient's signed informed consent is on file.     Plan:  Laparoscopic sleeve gastrectomy + hiatal hernia repair.           Dana Brennan MD           Electronically signed by Dana Brennan MD at 2/12/2018 12:28 PM              Vital Signs (last 24 hours)       02/22 0700  -  02/23 0659 02/23 0700  -  02/23 1326   Most Recent    Temp (°F)   98.2    97 -  97.3     97.3 (36.3)    Heart Rate   68    61 -  92     61    Resp   20    13 -  16     16    BP   127/62    90/51 -  121/63     115/93    SpO2 (%)   97    90 -  100     98          Hospital Medications (active)       Dose Frequency Start End    bupivacaine PF (MARCAINE) 0.25 % injection  - ADS Override Pull   2/23/2018 2/23/2018    Notes to Pharmacy: Created by cabinet override    bupivacaine PF (MARCAINE) 0.25 % injection  - ADS Override Pull   2/23/2018 2/23/2018    Notes to Pharmacy: Davey Early: cabinet override    ceFAZolin (ANCEF) 2-3 GM-% IVPB (duplex)  - ADS Override Pull   2/23/2018 2/23/2018    Notes to Pharmacy: Created by cabinet override    ceFAZolin (ANCEF) IVPB (duplex) 2 g 2 g 30 min pre-op 2/23/2018 2/23/2018    Sig - Route: Infuse 2,000 mg into a venous catheter 30 Min Pre-Op. - Intravenous    ceFAZolin (ANCEF) IVPB (duplex) 2 g 2 g Every 8 Hours 2/23/2018 2/24/2018    Sig - Route: Infuse 2,000 mg into a venous catheter Every 8 (Eight) Hours. - Intravenous    chlorhexidine (PERIDEX) 0.12 % solution 30 mL 30 mL Once 2/23/2018 2/23/2018    Sig - Route: Apply 30 mL to the mouth or throat 1 (One) Time. - Mouth/Throat    CloNIDine (CATAPRES) tablet 0.1 mg 0.1 mg Every 6 Hours PRN 2/23/2018     Sig - Route: Take 1 tablet by mouth Every 6 (Six) Hours As Needed for High Blood Pressure. - Oral    Linked Group 1:  \"And\" Linked Group Details        cyanocobalamin injection 1,000 mcg 1,000 mcg Once 2/24/2018  " "   Sig - Route: Inject 1 mL into the shoulder, thigh, or buttocks 1 (One) Time. - Intramuscular    diphenhydrAMINE (BENADRYL) injection 25 mg 25 mg Every 4 Hours PRN 2/23/2018     Sig - Route: Infuse 0.5 mL into a venous catheter Every 4 (Four) Hours As Needed for Itching. - Intravenous    enoxaparin (LOVENOX) 40 MG/0.4ML syringe  - ADS Override Pull   2/23/2018 2/23/2018    Notes to Pharmacy: Created by cabinet override    enoxaparin (LOVENOX) syringe 40 mg 40 mg Daily 2/24/2018     Sig - Route: Inject 0.4 mL under the skin Daily. - Subcutaneous    EPINEPHrine PF (ADRENALIN) 1 MG/ML injection  - ADS Override Pull   2/23/2018 2/23/2018    Notes to Pharmacy: Created by cabinet lupeide    HYDROcodone-acetaminophen (NORCO) 7.5-325 MG per tablet 1 tablet 1 tablet Every 4 Hours PRN 2/23/2018 3/5/2018    Sig - Route: Take 1 tablet by mouth Every 4 (Four) Hours As Needed for Moderate Pain . - Oral    HYDROmorphone (DILAUDID) injection 0.5 mg 0.5 mg Every 2 Hours PRN 2/23/2018 3/5/2018    Sig - Route: Infuse 0.5 mL into a venous catheter Every 2 (Two) Hours As Needed for Severe Pain . - Intravenous    Linked Group 2:  \"And\" Linked Group Details        labetalol (NORMODYNE,TRANDATE) injection 10 mg 10 mg Every 3 Hours PRN 2/23/2018     Sig - Route: Infuse 2 mL into a venous catheter Every 3 (Three) Hours As Needed for High Blood Pressure. - Intravenous    Linked Group 1:  \"And\" Linked Group Details        lactated ringers infusion 150 mL/hr Continuous 2/23/2018 2/24/2018    Sig - Route: Infuse 150 mL/hr into a venous catheter Continuous. - Intravenous    lisinopril (PRINIVIL,ZESTRIL) tablet 5 mg 5 mg Every 24 Hours Scheduled 2/24/2018     Sig - Route: Take 1 tablet by mouth Daily. - Oral    LORazepam (ATIVAN) tablet 1 mg 1 mg Every 12 Hours PRN 2/23/2018 3/5/2018    Sig - Route: Take 2 tablets by mouth Every 12 (Twelve) Hours As Needed for Anxiety. - Oral    metoclopramide (REGLAN) injection 10 mg 10 mg Every 6 Hours PRN " "2/23/2018     Sig - Route: Infuse 2 mL into a venous catheter Every 6 (Six) Hours As Needed for Nausea or Vomiting. - Intravenous    multiple vitamin (M.V.I. Adult) 10 mL, thiamine (B-1) 100 mg, folic acid 1 mg, pyridoxine (B-6) 25 mg in sodium chloride 0.9 % 1,000 mL infusion 100 mL/hr Once 2/24/2018     Sig - Route: Infuse 100 mL/hr into a venous catheter 1 (One) Time. - Intravenous    naloxone (NARCAN) injection 0.1 mg 0.1 mg Every 5 Minutes PRN 2/23/2018     Sig - Route: Infuse 0.25 mL into a venous catheter Every 5 (Five) Minutes As Needed for Respiratory Depression. - Intravenous    Linked Group 2:  \"And\" Linked Group Details        ondansetron (ZOFRAN) injection 4 mg 4 mg Every 6 Hours PRN 2/23/2018     Sig - Route: Infuse 2 mL into a venous catheter Every 6 (Six) Hours As Needed for Nausea or Vomiting. - Intravenous    Linked Group 3:  \"Or\" Linked Group Details        ondansetron (ZOFRAN) injection 4 mg 4 mg Once As Needed 2/23/2018 2/23/2018    Sig - Route: Infuse 2 mL into a venous catheter 1 (One) Time As Needed for Nausea or Vomiting. - Intravenous    ondansetron (ZOFRAN) tablet 4 mg 4 mg Every 6 Hours PRN 2/23/2018     Sig - Route: Take 1 tablet by mouth Every 6 (Six) Hours As Needed for Nausea or Vomiting. - Oral    Linked Group 3:  \"Or\" Linked Group Details        ondansetron ODT (ZOFRAN-ODT) disintegrating tablet 4 mg 4 mg Every 6 Hours PRN 2/23/2018     Sig - Route: Take 1 tablet by mouth Every 6 (Six) Hours As Needed for Nausea or Vomiting. - Oral    Linked Group 3:  \"Or\" Linked Group Details        pantoprazole (PROTONIX) injection 40 mg 40 mg Once 2/23/2018 2/23/2018    Sig - Route: Infuse 10 mL into a venous catheter 1 (One) Time. - Intravenous    pantoprazole (PROTONIX) injection 40 mg 40 mg Every Early Morning 2/24/2018     Sig - Route: Infuse 10 mL into a venous catheter Every Morning. - Intravenous    phenol (CHLORASEPTIC) 1.4 % liquid 2 spray 2 spray Every 2 Hours PRN 2/23/2018     Sig - " "Route: Apply 2 sprays to the mouth or throat Every 2 (Two) Hours As Needed for Sore Throat. - Mouth/Throat    prochlorperazine (COMPAZINE) injection 5 mg 5 mg Every 6 Hours PRN 2/23/2018     Sig - Route: Infuse 1 mL into a venous catheter Every 6 (Six) Hours As Needed for Nausea or Vomiting. - Intravenous    Linked Group 4:  \"Or\" Linked Group Details        prochlorperazine (COMPAZINE) suppository 25 mg 25 mg Every 12 Hours PRN 2/23/2018     Sig - Route: Insert 1 suppository into the rectum Every 12 (Twelve) Hours As Needed for Nausea or Vomiting. - Rectal    Linked Group 4:  \"Or\" Linked Group Details        prochlorperazine (COMPAZINE) tablet 5 mg 5 mg Every 6 Hours PRN 2/23/2018     Sig - Route: Take 0.5 tablets by mouth Every 6 (Six) Hours As Needed for Nausea or Vomiting. - Oral    Linked Group 4:  \"Or\" Linked Group Details        promethazine (PHENERGAN) injection 12.5 mg 12.5 mg Every 6 Hours PRN 2/23/2018     Sig - Route: Infuse 0.5 mL into a venous catheter Every 6 (Six) Hours As Needed for Nausea or Vomiting. - Intravenous    Linked Group 5:  \"Or\" Linked Group Details        promethazine (PHENERGAN) injection 12.5 mg 12.5 mg Every 6 Hours PRN 2/23/2018     Sig - Route: Inject 0.5 mL into the shoulder, thigh, or buttocks Every 6 (Six) Hours As Needed for Nausea or Vomiting. - Intramuscular    Linked Group 5:  \"Or\" Linked Group Details        promethazine (PHENERGAN) tablet 12.5 mg 12.5 mg Every 6 Hours PRN 2/23/2018     Sig - Route: Take 1 tablet by mouth Every 6 (Six) Hours As Needed for Nausea or Vomiting. - Oral    simethicone (MYLICON) chewable tablet 80 mg 80 mg 4 Times Daily PRN 2/23/2018     Sig - Route: Chew 1 tablet 4 (Four) Times a Day As Needed for Flatulence (and PRN bloated). - Oral    sodium chloride 0.45 % with KCl 20 mEq/L infusion 100 mL/hr Continuous 2/24/2018     Sig - Route: Infuse 100 mL/hr into a venous catheter Continuous. - Intravenous    sodium chloride 0.9 % bolus 1,000 mL 1,000 mL " Once 2/23/2018 2/23/2018    Sig - Route: Infuse 1,000 mL into a venous catheter 1 (One) Time. - Intravenous    sodium chloride 0.9 % flush 1-10 mL 1-10 mL As Needed 2/23/2018     Sig - Route: Infuse 1-10 mL into a venous catheter As Needed for Line Care. - Intravenous    bupivacaine PF (MARCAINE) 0.25 % injection (Discontinued)  As Needed 2/23/2018 2/23/2018    Sig: As Needed.    Reason for Discontinue: Anesthesia Stop    dexamethasone (DECADRON) injection (Discontinued)  As Needed 2/23/2018 2/23/2018    Sig - Route: Infuse  into a venous catheter As Needed. - Intravenous    Reason for Discontinue: Anesthesia Stop    diphenhydrAMINE (BENADRYL) injection (Discontinued)  As Needed 2/23/2018 2/23/2018    Sig: As Needed for Itching.    Reason for Discontinue: Anesthesia Stop    enoxaparin (LOVENOX) syringe 40 mg (Discontinued) 40 mg Once 2/23/2018 2/23/2018    Sig - Route: Inject 0.4 mL under the skin 1 (One) Time. - Subcutaneous    Reason for Discontinue: Patient Transfer    enoxaparin (LOVENOX) syringe (Discontinued)  As Needed 2/23/2018 2/23/2018    Sig: As Needed.    Reason for Discontinue: Patient Discharge    FentaNYL Citrate (PF) (SUBLIMAZE) injection (Discontinued)  As Needed 2/23/2018 2/23/2018    Sig - Route: Infuse  into a venous catheter As Needed for Severe Pain . - Intravenous    Reason for Discontinue: Anesthesia Stop    fibrin sealant component 4 ML external kit (Discontinued)  As Needed 2/23/2018 2/23/2018    Sig: As Needed.    Reason for Discontinue: Patient Discharge    haloperidol lactate (HALDOL) injection (Discontinued)  As Needed 2/23/2018 2/23/2018    Sig: As Needed for Agitation.    Reason for Discontinue: Anesthesia Stop    lidocaine (cardiac) (XYLOCAINE) injection (Discontinued)  As Needed 2/23/2018 2/23/2018    Sig - Route: Infuse  into a venous catheter As Needed. - Intravenous    Reason for Discontinue: Anesthesia Stop    LORazepam (ATIVAN) injection 0.5 mg (Discontinued) 0.5 mg Every 5  Minutes PRN 2/23/2018 2/23/2018    Sig - Route: Infuse 0.25 mL into a venous catheter Every 5 (Five) Minutes As Needed for Anxiety, Seizures, Agitation or Withdrawal. - Intravenous    Reason for Discontinue: Patient Transfer    meperidine (DEMEROL) injection 25 mg (Discontinued) 25 mg Every 10 Minutes PRN 2/23/2018 2/23/2018    Sig - Route: Infuse 0.5 mL into a venous catheter Every 10 (Ten) Minutes As Needed for Moderate Pain  or Shivering. - Intravenous    Reason for Discontinue: Patient Transfer    midazolam (VERSED) injection (Discontinued)  As Needed 2/23/2018 2/23/2018    Sig - Route: Infuse  into a venous catheter As Needed. - Intravenous    Reason for Discontinue: Anesthesia Stop    ondansetron (ZOFRAN) injection (Discontinued)  As Needed 2/23/2018 2/23/2018    Sig - Route: Infuse  into a venous catheter As Needed for Nausea or Vomiting. - Intravenous    Reason for Discontinue: Anesthesia Stop    Phenylephrine HCl-NaCl (PF) syringe (Discontinued)  As Needed 2/23/2018 2/23/2018    Sig: As Needed.    Reason for Discontinue: Anesthesia Stop    promethazine (PHENERGAN) injection (Discontinued)  As Needed 2/23/2018 2/23/2018    Sig: As Needed for Nausea or Vomiting.    Reason for Discontinue: Anesthesia Stop    propofol (DIPRIVAN) infusion 10 mg/mL 100 mL (Discontinued)  Continuous PRN 2/23/2018 2/23/2018    Sig: Continuous As Needed.    Reason for Discontinue: Anesthesia Stop    Propofol (DIPRIVAN) injection (Discontinued)  As Needed 2/23/2018 2/23/2018    Sig - Route: Infuse  into a venous catheter As Needed. - Intravenous    Reason for Discontinue: Anesthesia Stop    rocuronium (ZEMURON) injection (Discontinued)  As Needed 2/23/2018 2/23/2018    Sig - Route: Infuse  into a venous catheter As Needed. - Intravenous    Reason for Discontinue: Anesthesia Stop    Scopolamine (TRANSDERM-SCOP) 1.5 MG/3DAYS patch 1 patch (Discontinued) 1 patch Every 72 Hours 2/23/2018 2/23/2018    Sig - Route: Place 1 patch on the  skin Every 72 (Seventy-Two) Hours. - Transdermal    Reason for Discontinue: Patient Transfer    sodium chloride 0.45 % with KCl 20 mEq/L infusion (Discontinued) 100 mL/hr Continuous 2/23/2018 2/23/2018    Sig - Route: Infuse 100 mL/hr into a venous catheter Continuous. - Intravenous    sodium chloride 0.9 % infusion (Discontinued) 150 mL/hr Continuous 2/23/2018 2/23/2018    Sig - Route: Infuse 150 mL/hr into a venous catheter Continuous. - Intravenous    Reason for Discontinue: Patient Transfer    sodium chloride 0.9 % solution (Discontinued)  As Needed 2/23/2018 2/23/2018    Sig: As Needed.    Reason for Discontinue: Patient Discharge    succinylcholine (ANECTINE) injection (Discontinued)  As Needed 2/23/2018 2/23/2018    Sig - Route: Infuse  into a venous catheter As Needed. - Intravenous    Reason for Discontinue: Anesthesia Stop    sugammadex (BRIDION) injection (Discontinued)  As Needed 2/23/2018 2/23/2018    Sig: As Needed.    Reason for Discontinue: Anesthesia Stop             Operative/Procedure Notes (last 24 hours) (Notes from 2/22/2018  1:26 PM through 2/23/2018  1:26 PM)      Dana Brennan MD at 2/23/2018  7:23 AM  Version 1 of 1         GASTRIC SLEEVE LAPAROSCOPIC, HIATAL HERNIA REPAIR LAPAROSCOPIC, ESOPHAGOGASTRODUODENOSCOPY  Progress Note    Carine Franklin  2/23/2018    Pre-op Diagnosis:   Obesity, Class III, BMI 40-49.9 (morbid obesity) [E66.01]       Post-Op Diagnosis Codes:     * Obesity, Class III, BMI 40-49.9 (morbid obesity) [E66.01]    Procedure/CPT® Codes:      Procedure(s):  GASTRIC SLEEVE LAPAROSCOPIC  HIATAL HERNIA REPAIR LAPAROSCOPIC  ESOPHAGOGASTRODUODENOSCOPY    Surgeon(s):  Dana Brennan MD    Anesthesia: General with Block    Staff:   Circulator: Christi So RN  Scrub Person: Ramirez Hernandez    Estimated Blood Loss: 50 mL    Urine Voided: * No values recorded between 2/23/2018 12:00 AM and 2/23/2018  7:23 AM *    Specimens:                A: subtotal  gastrectomy      Drains:           Findings: normal liver, small hiatal hernia    Complications: none immediate      Dana Brennan MD     Date: 2/23/2018  Time: 7:23 AM         Electronically signed by Dana Brennan MD at 2/23/2018  9:56 AM      Dana Brennan MD at 2/23/2018  7:23 AM  Version 1 of 1         OPERATIVE REPORT    DATE: 02/23/18    PATIENT: Carine Franklin    PREOPERATIVE DIAGNOSIS:    1. Obesity with multiple comorbidities  2.  Hiatal hernia       POSTOPERATIVE DIAGNOSIS:    1. Obesity with multiple comorbidities  2.  Hiatal hernia     PROCEDURES PERFORMED:  1. Laparoscopic sleeve gastrectomy (85% subtotal vertical gastrectomy) over a  36-Maltese bougie dilator.   2.  Esophagogastroduodenoscopy  3.  Hiatal hernia repair     SURGEON:  Dana Brennan M.D.    ANESTHESIA:  General endotracheal with MARILYN block    ESTIMATED BLOOD LOSS:   50 mL    FLUIDS:  Crystalloids.    SPECIMENS:  Subtotal gastrectomy.    DRAINS:  None.    COUNTS:  Correct.    COMPLICATIONS:  None.    FINDINGS: normal liver, small hiatal hernia    INDICATIONS:   Carine Franklin is a 53 y.o. year old female with morbid obesity and associated comorbidities who presents for elective laparoscopic sleeve gastrectomy. The patient has has undergone our extensive preoperative education, teaching, and consent process. Everything is in order and they wish to proceed. Additionally, she had preoperative EGD showing hiatal hernia, and plan is to fix this today also.    DESCRIPTION OF PROCEDURE:   The patient was brought to the operating room and placed supine upon the operating room table. SCDs were placed. The patient underwent uneventful general endotracheal anesthesia and bilateral MARILYN blocks per the anesthesiology staff.  She received subcutaneous Lovenox and was given 2 g Ancef. The abdomen was prepped with ChloraPrep and draped in the usual sterile fashion. An Ioban was used as well.  Timeout was performed.        A small transverse incision was made a few centimeters above and to the left of the umbilicus and the peritoneal cavity entered under direct camera visualization using a 5 mm 0° laparoscope and an Optiview trocar. The abdomen was then insufflated to a pressure of 16 mmHg with CO2 gas. Exploratory laparoscopy revealed no evidence of injury from the entrance technique. There was some rectus diastasis and weakness at the umbilicus.  The liver had a uniform capsule and was moderate in size without evidence of gross hepatomegaly or fibrosis.  A 5 mm port was placed in the right mid abdomen laterally and a 15 mm port was placed just left of the umbilicus.  A  5 mm port was placed lateral and to the left to the first port and a 5 mm port was placed a handsbreadth lateral to that on the left.  A Deborah liver retractor was placed through a small subxiphoid stab incision and the the left lobe of the liver was elevated.       The stomach was decompressed with an orogastric tube, which was then withdrawn back into the esophagus. The greater curvature vessels were taken down with a Harmonic scalpel beginning at the midpoint of the stomach and continued up to the angle of His, including the short gastrics. The left bibi was completely exposed and there was a small hiatal hernia here/  This was photodocumented. The GE junction fat pad was elevated to make a clear landing zone for the stapler later. The greater curvature vessels were taken down with the Harmonic scalpel extending distally within 3 cm of the pylorus. Filmy adhesions to the stomach were taken down posteriorly.    I incised the hernia sac from the left bibi of the diaphragm.  I incised the phrenoesophageal ligament with Harmonic scalpel. The pars flaccida was opened (there was no replaced hepatic vessel) and the right bibi was exposed.  I incised the hernia sac from the right bibi.  An instrument was passed under the esophagus at the base of the hiatus and  brought easily out the other side.  A penrose drain was placed around the esophagus for retraction.  The esophagus was mobilized into the abdomen 5 cm.  A posterior cruroplasty was performed with three 0 silk stitches.  The crura came together without tension and repair was photodocumented.   The vagus nerves were identified and protected. The penrose was removed.  The time to repair the hernia was documented and was 26 minutes.      The OGT was removed, and the 36 Italian bougie dilator was passed transorally and positioned along the lesser curvature of the stomach with the tip at the pylorus. Using the bougie as template, a sleeve gastrectomy was performed with an articulating 60 mm [place] stapler bolstered by single Pennie-strips. The first load was black, and the rest of the loads (5) were green.The bougie was removed before the last staple load was fired. Care was taken to stay 1 cm away from the esophagus to prevent any esophagus fibers from being divided. The staple line was examined. There were several points of bleeding that required cautery for hemostasis. The gastrectomy specimen was removed through the 15 mm port site in a specimen bag. The specimen was later examined, and the staples were well-formed. Palpation of the specimen revealed no masses. The staple line of the sleeve gastrectomy revealed staples that were well-formed.      The flexible endoscope was passed transorally down to the pylorus easily. The sleeve extended to within 2-3 cm proximal to the pylorus and was hemostatic. The sleeve was not narrow or twisted. There was no persistent hiatal hernia or distal esophagitis. The rest of the esophagus was normal. The scope was removed. The leak test was normal.    I rescrubbed and suctioned the irrigation fluid from the upper abdomen, making sure it was dry. The staple line on the stomach was hemostatic. There was an area between the first and second staple fires that was trying to twist a little,  and so this area was sutured to the cut edge of the omentum with 2-0 vicryl.  After tacking it down, the stomach lay nicely.  The staple line was treated with 4 ml of aerosolized Tisseel fibrin glue.  The liver retractor was removed easily.      The 15 mm port was removed under direct visualization and there was no bleeding. This incision was closed with an 0-vicryl transfascial suture using a suture passer under direct visualization in a horizontal mattress fashion. All other ports were removed and then replaced under direct visualization and all of these were hemostatic. The instruments were removed and the abdomen was desufflated. The ports were removed. A 2-0 vicryl was used to close the subcutaneous tissue in the 15 mm port site. 3-0 monocryl plus was used to close skin incisions with interrupted sutures. Skin Affix skin glue was placed. The patient was awakened and taken to recovery in good condition, having tolerated the procedure well. All sponge, needle, and instrument counts were correct.                   Electronically signed by Dana Brennan MD at 2/23/2018  9:59 AM        Physician Progress Notes (last 24 hours) (Notes from 2/22/2018  1:26 PM through 2/23/2018  1:26 PM)     No notes of this type exist for this encounter.        Consult Notes (last 24 hours) (Notes from 2/22/2018  1:26 PM through 2/23/2018  1:26 PM)     No notes of this type exist for this encounter.

## 2018-02-23 NOTE — PLAN OF CARE
Problem: Perioperative Period (Adult)  Intervention: Promote Pulmonary Hygiene and Secretion Clearance   02/23/18 1011   Positioning   Head Of Bed (HOB) Position HOB at 15 degrees;HOB at 20 degrees   Promote Aggressive Pulmonary Hygiene/Secretion Management   Cough And Deep Breathing unable to perform     Intervention: Monitor/Manage Pain   02/23/18 1028   Safety Interventions   Medication Review/Management medications reviewed   Manage Acute Burn Pain   Pain Management Interventions medicated     Intervention: Prevent Pennie-procedural Injury   02/23/18 1011   Positioning   Positioning semi-Fowlers;side lying, right   Head Of Bed (HOB) Position HOB at 15 degrees     Intervention: Prevent/Manage DVT/VTE Risk   02/23/18 1028   Support Surgical/Anesthesia Recovery   Venous Thromboembolism Prevent/Manage sequential compression devices on     Intervention: Monitor/Manage Postoperative Bleeding   02/23/18 1028   Safety Interventions   Bleeding Management dressing monitored     Intervention: Promote Normothermia   02/23/18 1011   Cardiac Interventions   Warming Thermoregulation Maintenance skin exposure time minimized

## 2018-02-24 ENCOUNTER — APPOINTMENT (OUTPATIENT)
Dept: GENERAL RADIOLOGY | Facility: HOSPITAL | Age: 53
End: 2018-02-24

## 2018-02-24 VITALS
WEIGHT: 248.9 LBS | RESPIRATION RATE: 18 BRPM | OXYGEN SATURATION: 95 % | HEIGHT: 66 IN | SYSTOLIC BLOOD PRESSURE: 124 MMHG | HEART RATE: 66 BPM | TEMPERATURE: 98.3 F | DIASTOLIC BLOOD PRESSURE: 61 MMHG | BODY MASS INDEX: 40 KG/M2

## 2018-02-24 LAB
ALBUMIN SERPL-MCNC: 3.5 G/DL (ref 3.5–5)
ALBUMIN/GLOB SERPL: 1.4 G/DL (ref 1–2)
ALP SERPL-CCNC: 41 U/L (ref 38–126)
ALT SERPL W P-5'-P-CCNC: 110 U/L (ref 13–69)
ANION GAP SERPL CALCULATED.3IONS-SCNC: 15.5 MMOL/L
AST SERPL-CCNC: 106 U/L (ref 15–46)
BASOPHILS # BLD AUTO: 0.02 10*3/MM3 (ref 0–0.2)
BASOPHILS NFR BLD AUTO: 0.2 % (ref 0–2.5)
BILIRUB SERPL-MCNC: 0.5 MG/DL (ref 0.2–1.3)
BUN BLD-MCNC: 16 MG/DL (ref 7–20)
BUN/CREAT SERPL: 22.9 (ref 7.1–23.5)
CALCIUM SPEC-SCNC: 8.9 MG/DL (ref 8.4–10.2)
CHLORIDE SERPL-SCNC: 106 MMOL/L (ref 98–107)
CO2 SERPL-SCNC: 28 MMOL/L (ref 26–30)
CREAT BLD-MCNC: 0.7 MG/DL (ref 0.6–1.3)
DEPRECATED RDW RBC AUTO: 41.7 FL (ref 37–54)
EOSINOPHIL # BLD AUTO: 0 10*3/MM3 (ref 0–0.7)
EOSINOPHIL NFR BLD AUTO: 0 % (ref 0–7)
ERYTHROCYTE [DISTWIDTH] IN BLOOD BY AUTOMATED COUNT: 12.9 % (ref 11.5–14.5)
GFR SERPL CREATININE-BSD FRML MDRD: 87 ML/MIN/1.73
GLOBULIN UR ELPH-MCNC: 2.5 GM/DL
GLUCOSE BLD-MCNC: 84 MG/DL (ref 74–98)
GLUCOSE BLDC GLUCOMTR-MCNC: 96 MG/DL (ref 70–130)
HCT VFR BLD AUTO: 33.7 % (ref 37–47)
HGB BLD-MCNC: 11.3 G/DL (ref 12–16)
IMM GRANULOCYTES # BLD: 0.04 10*3/MM3 (ref 0–0.06)
IMM GRANULOCYTES NFR BLD: 0.4 % (ref 0–0.6)
IRON 24H UR-MRATE: 59 MCG/DL (ref 37–181)
LYMPHOCYTES # BLD AUTO: 2.51 10*3/MM3 (ref 0.6–3.4)
LYMPHOCYTES NFR BLD AUTO: 24.4 % (ref 10–50)
MCH RBC QN AUTO: 29.6 PG (ref 27–31)
MCHC RBC AUTO-ENTMCNC: 33.5 G/DL (ref 30–37)
MCV RBC AUTO: 88.2 FL (ref 81–99)
MONOCYTES # BLD AUTO: 0.74 10*3/MM3 (ref 0–0.9)
MONOCYTES NFR BLD AUTO: 7.2 % (ref 0–12)
NEUTROPHILS # BLD AUTO: 6.99 10*3/MM3 (ref 2–6.9)
NEUTROPHILS NFR BLD AUTO: 67.8 % (ref 37–80)
NRBC BLD MANUAL-RTO: 0 /100 WBC (ref 0–0)
PLATELET # BLD AUTO: 245 10*3/MM3 (ref 130–400)
PMV BLD AUTO: 9.3 FL (ref 6–12)
POTASSIUM BLD-SCNC: 3.5 MMOL/L (ref 3.5–5.1)
PROT SERPL-MCNC: 6 G/DL (ref 6.3–8.2)
RBC # BLD AUTO: 3.82 10*6/MM3 (ref 4.2–5.4)
SODIUM BLD-SCNC: 146 MMOL/L (ref 137–145)
WBC NRBC COR # BLD: 10.3 10*3/MM3 (ref 4.8–10.8)

## 2018-02-24 PROCEDURE — 99024 POSTOP FOLLOW-UP VISIT: CPT | Performed by: SURGERY

## 2018-02-24 PROCEDURE — 25010000002 ENOXAPARIN PER 10 MG: Performed by: SURGERY

## 2018-02-24 PROCEDURE — 85025 COMPLETE CBC W/AUTO DIFF WBC: CPT | Performed by: SURGERY

## 2018-02-24 PROCEDURE — 25010000002 PYRIDOXINE PER 100 MG: Performed by: SURGERY

## 2018-02-24 PROCEDURE — 25010000002 THIAMINE PER 100 MG: Performed by: SURGERY

## 2018-02-24 PROCEDURE — 25010000002 HYDROMORPHONE PER 4 MG: Performed by: SURGERY

## 2018-02-24 PROCEDURE — 74241: CPT

## 2018-02-24 PROCEDURE — 25010000002 CYANOCOBALAMIN PER 1000 MCG: Performed by: SURGERY

## 2018-02-24 PROCEDURE — 25010000003 CEFAZOLIN PER 500 MG: Performed by: SURGERY

## 2018-02-24 PROCEDURE — 80053 COMPREHEN METABOLIC PANEL: CPT | Performed by: SURGERY

## 2018-02-24 PROCEDURE — 94799 UNLISTED PULMONARY SVC/PX: CPT

## 2018-02-24 PROCEDURE — 83540 ASSAY OF IRON: CPT | Performed by: SURGERY

## 2018-02-24 RX ORDER — POTASSIUM CHLORIDE 1.5 G/1.77G
40 POWDER, FOR SOLUTION ORAL ONCE
Status: COMPLETED | OUTPATIENT
Start: 2018-02-24 | End: 2018-02-24

## 2018-02-24 RX ORDER — LISINOPRIL 10 MG/1
10 TABLET ORAL DAILY
Qty: 30 TABLET | Refills: 1 | Status: SHIPPED | OUTPATIENT
Start: 2018-02-24 | End: 2018-05-07

## 2018-02-24 RX ORDER — HYDROCODONE BITARTRATE AND ACETAMINOPHEN 7.5; 325 MG/1; MG/1
1 TABLET ORAL EVERY 6 HOURS PRN
Qty: 30 TABLET | Refills: 0 | Status: SHIPPED | OUTPATIENT
Start: 2018-02-24 | End: 2018-05-07

## 2018-02-24 RX ORDER — ONDANSETRON 4 MG/1
4 TABLET, ORALLY DISINTEGRATING ORAL EVERY 8 HOURS PRN
Qty: 20 TABLET | Refills: 0 | Status: SHIPPED | OUTPATIENT
Start: 2018-02-24 | End: 2018-05-07

## 2018-02-24 RX ADMIN — CYANOCOBALAMIN 1000 MCG: 1000 INJECTION, SOLUTION INTRAMUSCULAR at 08:18

## 2018-02-24 RX ADMIN — FOLIC ACID 100 ML/HR: 5 INJECTION, SOLUTION INTRAMUSCULAR; INTRAVENOUS; SUBCUTANEOUS at 10:40

## 2018-02-24 RX ADMIN — POTASSIUM CHLORIDE 40 MEQ: 1.5 POWDER, FOR SOLUTION ORAL at 16:53

## 2018-02-24 RX ADMIN — CEFAZOLIN SODIUM 2 G: 2 SOLUTION INTRAVENOUS at 00:30

## 2018-02-24 RX ADMIN — LISINOPRIL 5 MG: 5 TABLET ORAL at 08:19

## 2018-02-24 RX ADMIN — ONDANSETRON 4 MG: 4 TABLET, FILM COATED ORAL at 16:53

## 2018-02-24 RX ADMIN — HYDROMORPHONE HYDROCHLORIDE 0.5 MG: 1 INJECTION, SOLUTION INTRAMUSCULAR; INTRAVENOUS; SUBCUTANEOUS at 06:11

## 2018-02-24 RX ADMIN — ENOXAPARIN SODIUM 40 MG: 40 INJECTION SUBCUTANEOUS at 08:19

## 2018-02-24 RX ADMIN — SODIUM CHLORIDE, POTASSIUM CHLORIDE, SODIUM LACTATE AND CALCIUM CHLORIDE 150 ML/HR: 600; 310; 30; 20 INJECTION, SOLUTION INTRAVENOUS at 03:51

## 2018-02-24 RX ADMIN — HYDROCODONE BITARTRATE AND ACETAMINOPHEN 1 TABLET: 7.5; 325 TABLET ORAL at 15:05

## 2018-02-24 RX ADMIN — PANTOPRAZOLE SODIUM 40 MG: 40 INJECTION, POWDER, FOR SOLUTION INTRAVENOUS at 06:05

## 2018-02-24 NOTE — NURSING NOTE
Patient was given the video instruction power point gastric sleeve discharge instructions  to view at approx. 0930. Patient signed copy of 1st power point slide and copy was placed in chart. Full power point copied and included in discharge instructions given to patient to take home.

## 2018-02-24 NOTE — PLAN OF CARE
Problem: Patient Care Overview (Adult)  Goal: Plan of Care Review  Outcome: Ongoing (interventions implemented as appropriate)   02/24/18 0137   Coping/Psychosocial Response Interventions   Plan Of Care Reviewed With patient;daughter   Patient Care Overview   Progress progress toward functional goals as expected   Outcome Evaluation   Outcome Summary/Follow up Plan npo ice chips,iv pain medication.walked halls tolerated well

## 2018-02-24 NOTE — PROGRESS NOTES
"Bariatric Surgery Progress Note:      Chief Complaint:  POD #1    Subjective     Interval History:  Doing well.  No complaints.  Pain controlled.  Denies N/V.  No fevers.  Ambulating.  Voiding.  IS 2500!  Wants to go home.  Reports \"stage 1 diet\" tray came with a sweet tea but she didn't drink it.      Objective     Vital Signs  Blood pressure 111/40, pulse 72, temperature 98.9 °F (37.2 °C), temperature source Oral, resp. rate 18, height 167.6 cm (66\"), weight 113 kg (248 lb 14.4 oz), SpO2 99 %, not currently breastfeeding.      Intake/Output Summary (Last 24 hours) at 02/24/18 1602  Last data filed at 02/24/18 1010   Gross per 24 hour   Intake             2130 ml   Output             1640 ml   Net              490 ml       Physical Exam:  General: Alert, NAD  Lungs: Clear  Heart: RRR  Abdomen: soft, appropriate, incisions clean and dry, no bowel sounds  Extremities: warm, (+) SCDs       Labs:  Lab Results (last 24 hours)     Procedure Component Value Units Date/Time    CBC & Differential [[id number]] Collected:  02/24/18 0616    Specimen:  Blood Updated:  02/24/18 0634    Narrative:       The following orders were created for panel order CBC & Differential.  Procedure                               Abnormality         Status                     ---------                               -----------         ------                     CBC Auto Differential[[id number]]        Abnormal            Final result                 Please view results for these tests on the individual orders.    CBC Auto Differential [[id number]]  (Abnormal) Collected:  02/24/18 0616    Specimen:  Blood Updated:  02/24/18 0634     WBC 10.30 10*3/mm3      RBC 3.82 (L) 10*6/mm3      Hemoglobin 11.3 (L) g/dL      Hematocrit 33.7 (L) %      MCV 88.2 fL      MCH 29.6 pg      MCHC 33.5 g/dL      RDW 12.9 %      RDW-SD 41.7 fl      MPV 9.3 fL      Platelets 245 10*3/mm3      Neutrophil % 67.8 %      Lymphocyte % 24.4 %      Monocyte % 7.2 %      Eosinophil " % 0.0 %      Basophil % 0.2 %      Immature Grans % 0.4 %      Neutrophils, Absolute 6.99 (H) 10*3/mm3      Lymphocytes, Absolute 2.51 10*3/mm3      Monocytes, Absolute 0.74 10*3/mm3      Eosinophils, Absolute 0.00 10*3/mm3      Basophils, Absolute 0.02 10*3/mm3      Immature Grans, Absolute 0.04 10*3/mm3      nRBC 0.0 /100 WBC     Comprehensive Metabolic Panel [[id number]]  (Abnormal) Collected:  02/24/18 0616    Specimen:  Blood Updated:  02/24/18 0644     Glucose 84 mg/dL      BUN 16 mg/dL      Creatinine 0.70 mg/dL      Sodium 146 (H) mmol/L      Potassium 3.5 mmol/L      Chloride 106 mmol/L      CO2 28.0 mmol/L      Calcium 8.9 mg/dL      Total Protein 6.0 (L) g/dL      Albumin 3.50 g/dL      ALT (SGPT) 110 (H) U/L      AST (SGOT) 106 (H) U/L      Alkaline Phosphatase 41 U/L      Total Bilirubin 0.5 mg/dL      eGFR Non African Amer 87 mL/min/1.73      Globulin 2.5 gm/dL      A/G Ratio 1.4 g/dL      BUN/Creatinine Ratio 22.9     Anion Gap 15.5 mmol/L     Narrative:       Abnormal estimated GFR should be followed by more specific studies to confirm end stage chronic renal disease. The equation used for calculation may not be accurate for patients less than 19 years old, greater than 70 years old, patients at extremes of weight, malnutrition, or with acute renal dysfunction.    Iron [[id number]]  (Normal) Collected:  02/24/18 0616    Specimen:  Blood Updated:  02/24/18 0644     Iron 59 mcg/dL     POC Glucose Once [[id number]]  (Normal) Collected:  02/23/18 0626    Specimen:  Blood Updated:  02/24/18 1205     Glucose 96 mg/dL       Serial Number: [id number]:  [id number]               Assessment/Plan     POD # 1 s/p LSG/HHR.    Doing well.  UGI normal post-sleeve, images and report reviewed.  Patient feels well and has met discharge criteria.  Will discharge home with follow up in 1 week with PA.  Rx given for Lortab, Zofran, lisinopril without Hctz.   Discharge instructions reviewed with patient and all questions  answered.              02/24/18  4:02 PM  Dana Brennan MD

## 2018-02-24 NOTE — PROGRESS NOTES
Patient: Carine Franklin  Procedure(s):  GASTRIC SLEEVE LAPAROSCOPIC  HIATAL HERNIA REPAIR LAPAROSCOPIC  ESOPHAGOGASTRODUODENOSCOPY  Anesthesia type: [unfilled]    Patient location: [facility] Surgical Floor  Last vitals:   Vitals:    02/24/18 0818   BP: 122/64   Pulse: 72   Resp:    Temp:    SpO2:      Level of consciousness: awake, alert and oriented    Post-anesthesia pain: adequate analgesia  Airway patency: patent  Respiratory: unassisted  Cardiovascular: stable  Hydration: euvolemic    Anesthetic complications: no

## 2018-02-24 NOTE — PLAN OF CARE
Problem: Patient Care Overview (Adult)  Goal: Plan of Care Review  Outcome: Ongoing (interventions implemented as appropriate)   02/24/18 1125   Coping/Psychosocial Response Interventions   Plan Of Care Reviewed With patient   Patient Care Overview   Progress improving       Problem: Perioperative Period (Adult)  Goal: Signs and Symptoms of Listed Potential Problems Will be Absent or Manageable (Perioperative Period)  Outcome: Ongoing (interventions implemented as appropriate)      Problem: Fall Risk (Adult)  Goal: Absence of Falls  Outcome: Ongoing (interventions implemented as appropriate)      Problem: Skin Integrity Impairment, Risk/Actual (Adult)  Goal: Skin Integrity/Wound Healing  Outcome: Ongoing (interventions implemented as appropriate)      Problem: Pain, Acute (Adult)  Goal: Acceptable Pain Control/Comfort Level  Outcome: Ongoing (interventions implemented as appropriate)

## 2018-02-28 LAB
LAB AP CASE REPORT: NORMAL
Lab: NORMAL
PATH REPORT.FINAL DX SPEC: NORMAL

## 2018-03-01 ENCOUNTER — OFFICE VISIT (OUTPATIENT)
Dept: BARIATRICS/WEIGHT MGMT | Facility: CLINIC | Age: 53
End: 2018-03-01

## 2018-03-01 VITALS
RESPIRATION RATE: 18 BRPM | WEIGHT: 241.5 LBS | HEART RATE: 82 BPM | OXYGEN SATURATION: 99 % | SYSTOLIC BLOOD PRESSURE: 128 MMHG | HEIGHT: 66 IN | BODY MASS INDEX: 38.81 KG/M2 | TEMPERATURE: 97.9 F | DIASTOLIC BLOOD PRESSURE: 84 MMHG

## 2018-03-01 DIAGNOSIS — R53.83 FATIGUE, UNSPECIFIED TYPE: Primary | ICD-10-CM

## 2018-03-01 DIAGNOSIS — R06.00 DYSPNEA, UNSPECIFIED TYPE: ICD-10-CM

## 2018-03-01 DIAGNOSIS — M54.9 BACK PAIN, UNSPECIFIED BACK LOCATION, UNSPECIFIED BACK PAIN LATERALITY, UNSPECIFIED CHRONICITY: ICD-10-CM

## 2018-03-01 DIAGNOSIS — R10.13 DYSPEPSIA: ICD-10-CM

## 2018-03-01 DIAGNOSIS — I10 ESSENTIAL HYPERTENSION: ICD-10-CM

## 2018-03-01 DIAGNOSIS — M19.90 OSTEOARTHRITIS, UNSPECIFIED OSTEOARTHRITIS TYPE, UNSPECIFIED SITE: ICD-10-CM

## 2018-03-01 DIAGNOSIS — K21.9 GASTROESOPHAGEAL REFLUX DISEASE, ESOPHAGITIS PRESENCE NOT SPECIFIED: ICD-10-CM

## 2018-03-01 PROCEDURE — 99024 POSTOP FOLLOW-UP VISIT: CPT | Performed by: SURGERY

## 2018-03-01 NOTE — PROGRESS NOTES
[facility] Bariatric Surgery  [address]  [facility] [address] [phone]      Patient Name:  Carine Franklin.  :  1965      Date of Visit: 3/1/2018      Reason for Visit:  POD #6    HPI:  Carine Franklin is a 53 y.o. female s/p LSG/HHR 18 by Dr. Brennan    Doing well.  Has tried 3 different shakes, complains of diarrhea after drinking protein shake.  Has tried 3 different kinds, some are lactose-free.  Has not tried imodium, only happens with drinking.  Did not happen with yogurt.  + weak.  Denies dysphagia, reflux, nausea, vomiting, pulmonary issues and fevers.  Tolerating diet progression - on stage 1.  Getting 50-60g prot/day.  Drinking 64fluid oz/day.  Has not started vitamins yet.  She has already purchased the pills.  On Lansoprazole.  Holding ASA , NSAIDs , Diuretics  and Steroids.  Ambulating.  Her PCP suggested she take only half of her 10 mg lisinopril and BP has been good.       Presurgery weight: 252 pounds.  Today's weight is 110 kg (241 lb 8 oz) pounds, today's  Body mass index is 38.98 kg/(m^2)., and her weight loss since surgery is 11 pounds.       Path reviewed with patient:  Benign stomach, areas of mild chronic gastritis.    Past Medical History:   Diagnosis Date   • Back pain    • Body piercing     EARS ONLY   • Borderline diabetes    • Fatigue    • Full dentures    • GERD (gastroesophageal reflux disease)     severe if skips daily Prevacid.  Last EGD  in [place] with findings of gastritis, no HH per patient   • H. pylori infection     x 2, treated both times   • Hiatal hernia    • Hypertension    • Iron deficiency anemia     improved with oral MVI + iron   •  (normal spontaneous vaginal delivery)     x 4   • Osteoarthritis of multiple joints 2017   • PONV (postoperative nausea and vomiting)    • Wears glasses      Past Surgical History:   Procedure Laterality Date   • ENDOMETRIAL ABLATION     • ENDOSCOPY N/A 2018     Procedure: ESOPHAGOGASTRODUODENOSCOPY;  Surgeon: Dana Brennan MD;  Location: [facility] OR;  Service:    • ESOPHAGOSCOPY / EGD     • GASTRIC SLEEVE LAPAROSCOPIC N/A 2/23/2018    Procedure: GASTRIC SLEEVE LAPAROSCOPIC;  Surgeon: Dana Brennan MD;  Location: [facility] OR;  Service:    • HIATAL HERNIA REPAIR N/A 2/23/2018    Procedure: HIATAL HERNIA REPAIR LAPAROSCOPIC;  Surgeon: Dana Brennan MD;  Location: [facility] OR;  Service:    • LAPAROSCOPIC TUBAL LIGATION     • [place] LAP,CHOLECYSTECTOMY N/A 1/20/2017    Procedure: CHOLECYSTECTOMY LAPAROSCOPIC;  Surgeon: Manoj Mendez MD;  Location: [facility] stones     Outpatient Prescriptions Marked as Taking for the 3/1/18 encounter (Office Visit) with Dana Brennan MD   Medication Sig Dispense Refill   • b complex-C-folic acid 1 MG capsule Take 1 capsule by mouth Daily.     • HYDROcodone-acetaminophen (NORCO) 7.5-325 MG per tablet Take 1 tablet by mouth Every 6 (Six) Hours As Needed for Moderate Pain . 30 tablet 0   • lansoprazole (PREVACID) 30 MG capsule Take 30 mg by mouth Daily.     • lisinopril (PRINIVIL,ZESTRIL) 10 MG tablet Take 1 tablet by mouth Daily. 30 tablet 1   • melatonin 5 MG tablet tablet Take 5 mg by mouth At Night As Needed.     • Multiple Vitamins-Minerals (MULTIVITAMIN WITH MINERALS) tablet tablet Take 1 tablet by mouth Daily.     • ondansetron ODT (ZOFRAN ODT) 4 MG disintegrating tablet Take 1 tablet by mouth Every 8 (Eight) Hours As Needed for Nausea or Vomiting. 20 tablet 0     Allergies   Allergen Reactions   • No Known Drug Allergy        Social History     Social History   • Marital status:      Spouse name: Jesse Franklin    • Number of children: 4   • Years of education: 8th grade      Occupational History   • Retired       worked for [employer]     Social History Main Topics   • Smoking status: Former Smoker     Years: 15.00     Types: Cigarettes     Quit date: 1990   • Smokeless tobacco: Never Used       "Comment: quit 26 years ago; Is not around secondhand smoke in house or car   • Alcohol use No   • Drug use: No   • Sexual activity: Defer      Comment:       Other Topics Concern   • Not on file     Social History Narrative    Lives with        /84 (BP Location: Left arm, Patient Position: Sitting, Cuff Size: Large Adult)  Pulse 82  Temp 97.9 °F (36.6 °C) (Temporal Artery )   Resp 18  Ht 167.6 cm (66\")  Wt 110 kg (241 lb 8 oz)  SpO2 99%  BMI 38.98 kg/m2  Physical Exam   Constitutional: She is oriented to person, place, and time. She appears well-developed and well-nourished. No distress.   HENT:   Head: Normocephalic and atraumatic.   Mouth/Throat: No oropharyngeal exudate.   Eyes: Conjunctivae and EOM are normal. Pupils are equal, round, and reactive to light.   Pulmonary/Chest: Effort normal. No respiratory distress.   Abdominal: Soft. She exhibits no distension.   Incisions well-healing with bruising around each one.  No induration/erythema/fluctuance/drainage   Neurological: She is alert and oriented to person, place, and time. No cranial nerve deficit.   Skin: Skin is warm and dry. No rash noted. No erythema.   Psychiatric: She has a normal mood and affect. Her behavior is normal. Judgment and thought content normal.         Assessment:   POD # 6      Plan:  Doing well. Continue to advance diet per manual.  Increase protein intake to 100g/day.  Discussed some non-milk proteins, such as Vital Protein, New Whey, Isopure.  Avoid Genepro.  Sounds like diarrhea secondary to shakes or aggravated gastrocolic reflex.  Try imodium 30 minutes before each meal.  Increase exercise/activity as tolerated.  Reviewed lifting restrictions, nothing >25 lbs x 2 more weeks.  Continue vitamins.  Continue PPI.  Continue to avoid ASA/NSAIDs/Steroids x 6 weeks postop.  Call w/ problems/concerns.    The patient was instructed to follow up in 3 weeks, sooner if needed.     Dana Brennan MD    "

## 2018-05-07 ENCOUNTER — OFFICE VISIT (OUTPATIENT)
Dept: BARIATRICS/WEIGHT MGMT | Facility: CLINIC | Age: 53
End: 2018-05-07

## 2018-05-07 VITALS
RESPIRATION RATE: 18 BRPM | BODY MASS INDEX: 35.84 KG/M2 | TEMPERATURE: 97.7 F | WEIGHT: 223 LBS | SYSTOLIC BLOOD PRESSURE: 118 MMHG | HEIGHT: 66 IN | OXYGEN SATURATION: 99 % | HEART RATE: 76 BPM | DIASTOLIC BLOOD PRESSURE: 74 MMHG

## 2018-05-07 DIAGNOSIS — M54.9 BACK PAIN, UNSPECIFIED BACK LOCATION, UNSPECIFIED BACK PAIN LATERALITY, UNSPECIFIED CHRONICITY: ICD-10-CM

## 2018-05-07 DIAGNOSIS — Z13.21 MALNUTRITION SCREEN: ICD-10-CM

## 2018-05-07 DIAGNOSIS — E55.9 HYPOVITAMINOSIS D: ICD-10-CM

## 2018-05-07 DIAGNOSIS — K91.2 POSTGASTRECTOMY MALABSORPTION: ICD-10-CM

## 2018-05-07 DIAGNOSIS — Z13.0 SCREENING, IRON DEFICIENCY ANEMIA: ICD-10-CM

## 2018-05-07 DIAGNOSIS — M19.90 OSTEOARTHRITIS, UNSPECIFIED OSTEOARTHRITIS TYPE, UNSPECIFIED SITE: ICD-10-CM

## 2018-05-07 DIAGNOSIS — R53.83 FATIGUE, UNSPECIFIED TYPE: Primary | ICD-10-CM

## 2018-05-07 DIAGNOSIS — Z90.3 POSTGASTRECTOMY MALABSORPTION: ICD-10-CM

## 2018-05-07 DIAGNOSIS — K21.9 GASTROESOPHAGEAL REFLUX DISEASE, ESOPHAGITIS PRESENCE NOT SPECIFIED: ICD-10-CM

## 2018-05-07 DIAGNOSIS — I10 ESSENTIAL HYPERTENSION: ICD-10-CM

## 2018-05-07 PROCEDURE — 99024 POSTOP FOLLOW-UP VISIT: CPT | Performed by: SURGERY

## 2018-05-07 NOTE — PROGRESS NOTES
[facility] Bariatric Surgery  [address]  [facility] [address] [phone]        Patient Name:  Carine Franklin.  :  1965      Date of Visit: 2018      Reason for Visit:   2.5 months postop     HPI: Carine Franklin is a 53 y.o. female s/p  LSG/HHR 18 by Dr. Brennan.  Missed 1 month appointment due to her 's illness.    Doing well.  No issues/concerns. Denies dysphagia, reflux, nausea, vomiting and abdominal pain.  Getting 50-70 g prot/day.  Drinking 64 fluid oz/day.  Taking MVI, B12, B1, Calcium, Vit D, iron and Vit C.  On Prevacid..  Exercise: walks a little bit every day, 1 mile (25 minutes) 2 days per week.  Limited by her 's illness final stages of COPD, so she has to stay close to home.       Presurgery weight: 252 pounds.  Today's weight is 101 kg (223 lb) pounds, today's  Body mass index is 35.99 kg/m²., and her weight loss since surgery is 29 pounds.      Breakfast: varies. Skipped today.  Dislikes protein shakes because caused diarrhea.  Has not tried imodium.  When she eats  Breakfast, may eat 4 oz pack of oatmeal.  Thinks 4 grams protein at breakfast.  Lunch: protein bar (10 g), pack of peanuts (12 g)  Dinner:  30 g protein turkey jordon.  Mozzarella cheese + ranch dressing. Or small chicken salad at [employer] + spinach (25 g protein estimated)  Snacks: none    Past Medical History:   Diagnosis Date   • Back pain    • Body piercing     EARS ONLY   • Borderline diabetes    • Fatigue    • Full dentures    • GERD (gastroesophageal reflux disease)     severe if skips daily Prevacid.  Last EGD  in [place] with findings of gastritis, no HH per patient   • H. pylori infection     x 2, treated both times   • Hiatal hernia    • Hypertension    • Iron deficiency anemia     improved with oral MVI + iron   •  (normal spontaneous vaginal delivery)     x 4   • Osteoarthritis of multiple joints 2017   • PONV (postoperative nausea and  vomiting)    • Wears glasses      Past Surgical History:   Procedure Laterality Date   • ENDOMETRIAL ABLATION     • ENDOSCOPY N/A 2/23/2018    Procedure: ESOPHAGOGASTRODUODENOSCOPY;  Surgeon: Dana Brennan MD;  Location: [facility] OR;  Service:    • ESOPHAGOSCOPY / EGD     • GASTRIC SLEEVE LAPAROSCOPIC N/A 2/23/2018    Procedure: GASTRIC SLEEVE LAPAROSCOPIC;  Surgeon: Dana Brennan MD;  Location: [facility] OR;  Service:    • HIATAL HERNIA REPAIR N/A 2/23/2018    Procedure: HIATAL HERNIA REPAIR LAPAROSCOPIC;  Surgeon: Dana Brennan MD;  Location: [facility] OR;  Service:    • LAPAROSCOPIC TUBAL LIGATION     • [place] LAP,CHOLECYSTECTOMY N/A 1/20/2017    Procedure: CHOLECYSTECTOMY LAPAROSCOPIC;  Surgeon: Manoj Mendez MD;  Location: [facility] for stones     Outpatient Prescriptions Marked as Taking for the 5/7/18 encounter (Office Visit) with Dana Brennan MD   Medication Sig Dispense Refill   • lansoprazole (PREVACID) 30 MG capsule Take 30 mg by mouth Daily.     • Multiple Vitamins-Minerals (MULTIVITAMIN WITH MINERALS) tablet tablet Take 1 tablet by mouth Daily.         Allergies   Allergen Reactions   • No Known Drug Allergy        Social History     Social History   • Marital status:      Spouse name: Jesse Franklin    • Number of children: 4   • Years of education: 8th grade      Occupational History   • Retired       worked for [employer]     Social History Main Topics   • Smoking status: Former Smoker     Years: 15.00     Types: Cigarettes     Quit date: 1990   • Smokeless tobacco: Never Used      Comment: quit 26 years ago; Is not around secondhand smoke in house or car   • Alcohol use No   • Drug use: No   • Sexual activity: Defer      Comment:       Other Topics Concern   • Not on file     Social History Narrative    Lives with        /74 (BP Location: Left arm, Patient Position: Sitting, Cuff Size: Large Adult)   Pulse 76   Temp 97.7 °F (36.5 °C)  "(Temporal Artery )   Resp 18   Ht 167.6 cm (66\")   Wt 101 kg (223 lb)   SpO2 99%   BMI 35.99 kg/m²     Physical Exam   Constitutional: She is oriented to person, place, and time. She appears well-developed and well-nourished. No distress.   HENT:   Head: Normocephalic and atraumatic.   Mouth/Throat: No oropharyngeal exudate.   Pulmonary/Chest: Effort normal. No respiratory distress.   Abdominal: Soft. She exhibits no distension.   Neurological: She is alert and oriented to person, place, and time. No cranial nerve deficit.   Skin: Skin is warm and dry. No rash noted. She is not diaphoretic. No erythema.   Psychiatric: She has a normal mood and affect. Her behavior is normal. Judgment and thought content normal.         Assessment:  3 months s/p  LSG/HHR 2/23/18 by Dr. Brennan.    ICD-10-CM ICD-9-CM   1. Fatigue, unspecified type R53.83 780.79   2. Hypovitaminosis D E55.9 268.9   3. Postgastrectomy malabsorption K91.2 579.3    Z90.3    4. Screening, iron deficiency anemia Z13.0 V78.0   5. Malnutrition screen Z13.21 V77.2   6. Essential hypertension I10 401.9   7. Gastroesophageal reflux disease, esophagitis presence not specified K21.9 530.81   8. Osteoarthritis, unspecified osteoarthritis type, unspecified site M19.90 715.90   9. Back pain, unspecified back location, unspecified back pain laterality, unspecified chronicity M54.9 724.5         Plan:  Doing well. Continue w/ good food choices and healthy habits.  Strive for protein >70g/day--gave specific examples.  Start routine exercise, 30 minutes 3-5 days per week.  Routine bariatric labs ordered.  Continue vitamins w/ adjustments pending lab results.  Call w/ problems/concerns.     Ok to stop PPI, if doesn't tolerate, restart.    The patient was instructed to follow up in 3 months, sooner if needed.    note: approx 15 of the 25 minute visit was spent counseling on nutrition and necessary dietary/lifestyle modifications.    Dana Brennan MD  "

## 2018-07-30 LAB
25(OH)D3+25(OH)D2 SERPL-MCNC: 51.1 NG/ML (ref 30–100)
A-TOCOPHEROL VIT E SERPL-MCNC: 11.3 MG/L (ref 7–25.1)
ALBUMIN SERPL-MCNC: 4.7 G/DL (ref 3.5–5.5)
ALBUMIN/GLOB SERPL: 1.9 {RATIO} (ref 1.2–2.2)
ALP SERPL-CCNC: 66 IU/L (ref 39–117)
ALT SERPL-CCNC: 11 IU/L (ref 0–32)
AST SERPL-CCNC: 16 IU/L (ref 0–40)
BASOPHILS # BLD AUTO: 0 X10E3/UL (ref 0–0.2)
BASOPHILS NFR BLD AUTO: 0 %
BILIRUB SERPL-MCNC: 0.5 MG/DL (ref 0–1.2)
BUN SERPL-MCNC: 16 MG/DL (ref 6–24)
BUN/CREAT SERPL: 21 (ref 9–23)
CALCIUM SERPL-MCNC: 10.4 MG/DL (ref 8.7–10.2)
CHLORIDE SERPL-SCNC: 105 MMOL/L (ref 96–106)
CO2 SERPL-SCNC: 24 MMOL/L (ref 18–29)
CREAT SERPL-MCNC: 0.75 MG/DL (ref 0.57–1)
EOSINOPHIL # BLD AUTO: 0.1 X10E3/UL (ref 0–0.4)
EOSINOPHIL NFR BLD AUTO: 1 %
ERYTHROCYTE [DISTWIDTH] IN BLOOD BY AUTOMATED COUNT: 14.2 % (ref 12.3–15.4)
FERRITIN SERPL-MCNC: 66 NG/ML (ref 15–150)
FOLATE SERPL-MCNC: >20 NG/ML
GAMMA TOCOPHEROL SERPL-MCNC: 1.8 MG/L (ref 0.5–5.5)
GLOBULIN SER CALC-MCNC: 2.5 G/DL (ref 1.5–4.5)
GLUCOSE SERPL-MCNC: 96 MG/DL (ref 65–99)
HCT VFR BLD AUTO: 44.1 % (ref 34–46.6)
HGB BLD-MCNC: 15 G/DL (ref 11.1–15.9)
IMM GRANULOCYTES # BLD: 0 X10E3/UL (ref 0–0.1)
IMM GRANULOCYTES NFR BLD: 0 %
IRON SERPL-MCNC: 89 UG/DL (ref 27–159)
LYMPHOCYTES # BLD AUTO: 2.9 X10E3/UL (ref 0.7–3.1)
LYMPHOCYTES NFR BLD AUTO: 41 %
MAGNESIUM SERPL-MCNC: 2.1 MG/DL (ref 1.6–2.3)
MCH RBC QN AUTO: 30.4 PG (ref 26.6–33)
MCHC RBC AUTO-ENTMCNC: 34 G/DL (ref 31.5–35.7)
MCV RBC AUTO: 90 FL (ref 79–97)
METHYLMALONATE SERPL-SCNC: 149 NMOL/L (ref 0–378)
MONOCYTES # BLD AUTO: 0.3 X10E3/UL (ref 0.1–0.9)
MONOCYTES NFR BLD AUTO: 5 %
NEUTROPHILS # BLD AUTO: 3.8 X10E3/UL (ref 1.4–7)
NEUTROPHILS NFR BLD AUTO: 53 %
PHOSPHATE SERPL-MCNC: 3 MG/DL (ref 2.5–4.5)
PLATELET # BLD AUTO: 283 X10E3/UL (ref 150–379)
POTASSIUM SERPL-SCNC: 4.9 MMOL/L (ref 3.5–5.2)
PREALB SERPL-MCNC: 20 MG/DL (ref 10–36)
PROT SERPL-MCNC: 7.2 G/DL (ref 6–8.5)
PTH-INTACT SERPL-MCNC: 23 PG/ML (ref 15–65)
RBC # BLD AUTO: 4.93 X10E6/UL (ref 3.77–5.28)
SODIUM SERPL-SCNC: 146 MMOL/L (ref 134–144)
VIT A SERPL-MCNC: 39.3 UG/DL (ref 33.1–100)
VIT B1 BLD-SCNC: 268.4 NMOL/L (ref 66.5–200)
WBC # BLD AUTO: 7.2 X10E3/UL (ref 3.4–10.8)
ZINC SERPL-MCNC: 65 UG/DL (ref 56–134)

## 2018-10-15 RX ORDER — PANTOPRAZOLE SODIUM 40 MG/1
TABLET, DELAYED RELEASE ORAL
Qty: 90 TABLET | Refills: 1 | Status: SHIPPED | OUTPATIENT
Start: 2018-10-15 | End: 2019-07-25

## 2019-07-15 ENCOUNTER — PREP FOR SURGERY (OUTPATIENT)
Dept: OTHER | Facility: HOSPITAL | Age: 54
End: 2019-07-15

## 2019-07-15 DIAGNOSIS — R10.2 PELVIC PAIN: Primary | ICD-10-CM

## 2019-07-15 DIAGNOSIS — R32 URINARY INCONTINENCE IN FEMALE: ICD-10-CM

## 2019-07-15 RX ORDER — SODIUM CHLORIDE 0.9 % (FLUSH) 0.9 %
3-10 SYRINGE (ML) INJECTION AS NEEDED
Status: CANCELLED | OUTPATIENT
Start: 2019-07-15

## 2019-07-15 RX ORDER — CEFOXITIN SODIUM 2 G/50ML
2 INJECTION, SOLUTION INTRAVENOUS
Status: CANCELLED | OUTPATIENT
Start: 2019-07-15

## 2019-07-22 ENCOUNTER — HOSPITAL ENCOUNTER (OUTPATIENT)
Dept: BONE DENSITY | Facility: HOSPITAL | Age: 54
Discharge: HOME | End: 2019-07-22

## 2019-07-22 ENCOUNTER — HOSPITAL ENCOUNTER (OUTPATIENT)
Dept: MAMMOGRAPHY | Facility: HOSPITAL | Age: 54
Discharge: HOME | End: 2019-07-22
Admitting: OBSTETRICS & GYNECOLOGY

## 2019-07-22 DIAGNOSIS — Z13.820 SCREENING FOR OSTEOPOROSIS: ICD-10-CM

## 2019-07-22 DIAGNOSIS — Z12.39 SCREENING BREAST EXAMINATION: ICD-10-CM

## 2019-07-22 PROCEDURE — 77067 SCR MAMMO BI INCL CAD: CPT

## 2019-07-22 PROCEDURE — 77080 DXA BONE DENSITY AXIAL: CPT

## 2019-07-22 PROCEDURE — 77063 BREAST TOMOSYNTHESIS BI: CPT

## 2019-07-22 PROCEDURE — 77080 DXA BONE DENSITY AXIAL: CPT | Performed by: RADIOLOGY

## 2019-07-22 PROCEDURE — 77067 SCR MAMMO BI INCL CAD: CPT | Performed by: RADIOLOGY

## 2019-07-22 PROCEDURE — 77063 BREAST TOMOSYNTHESIS BI: CPT | Performed by: RADIOLOGY

## 2019-07-25 ENCOUNTER — APPOINTMENT (OUTPATIENT)
Dept: PREADMISSION TESTING | Facility: HOSPITAL | Age: 54
End: 2019-07-25

## 2019-07-25 DIAGNOSIS — R32 URINARY INCONTINENCE IN FEMALE: ICD-10-CM

## 2019-07-25 DIAGNOSIS — R10.2 PELVIC PAIN: ICD-10-CM

## 2019-07-25 LAB
ABO GROUP BLD: NORMAL
ANION GAP SERPL CALCULATED.3IONS-SCNC: 10.7 MMOL/L (ref 5–15)
BASOPHILS # BLD AUTO: 0.03 10*3/MM3 (ref 0–0.2)
BASOPHILS NFR BLD AUTO: 0.5 % (ref 0–1.5)
BLD GP AB SCN SERPL QL: NEGATIVE
BUN BLD-MCNC: 18 MG/DL (ref 6–20)
BUN/CREAT SERPL: 22.5 (ref 7–25)
CALCIUM SPEC-SCNC: 9.7 MG/DL (ref 8.6–10.5)
CHLORIDE SERPL-SCNC: 104 MMOL/L (ref 98–107)
CO2 SERPL-SCNC: 29.3 MMOL/L (ref 22–29)
CREAT BLD-MCNC: 0.8 MG/DL (ref 0.57–1)
DEPRECATED RDW RBC AUTO: 40.3 FL (ref 37–54)
EOSINOPHIL # BLD AUTO: 0.13 10*3/MM3 (ref 0–0.4)
EOSINOPHIL NFR BLD AUTO: 2.3 % (ref 0.3–6.2)
ERYTHROCYTE [DISTWIDTH] IN BLOOD BY AUTOMATED COUNT: 12.1 % (ref 12.3–15.4)
GFR SERPL CREATININE-BSD FRML MDRD: 74 ML/MIN/1.73
GLUCOSE BLD-MCNC: 96 MG/DL (ref 65–99)
HCT VFR BLD AUTO: 41.4 % (ref 34–46.6)
HGB BLD-MCNC: 13.9 G/DL (ref 12–15.9)
IMM GRANULOCYTES # BLD AUTO: 0.01 10*3/MM3 (ref 0–0.05)
IMM GRANULOCYTES NFR BLD AUTO: 0.2 % (ref 0–0.5)
LYMPHOCYTES # BLD AUTO: 2.15 10*3/MM3 (ref 0.7–3.1)
LYMPHOCYTES NFR BLD AUTO: 37.7 % (ref 19.6–45.3)
MCH RBC QN AUTO: 31.1 PG (ref 26.6–33)
MCHC RBC AUTO-ENTMCNC: 33.6 G/DL (ref 31.5–35.7)
MCV RBC AUTO: 92.6 FL (ref 79–97)
MONOCYTES # BLD AUTO: 0.5 10*3/MM3 (ref 0.1–0.9)
MONOCYTES NFR BLD AUTO: 8.8 % (ref 5–12)
NEUTROPHILS # BLD AUTO: 2.89 10*3/MM3 (ref 1.7–7)
NEUTROPHILS NFR BLD AUTO: 50.5 % (ref 42.7–76)
PLATELET # BLD AUTO: 226 10*3/MM3 (ref 140–450)
PMV BLD AUTO: 9.5 FL (ref 6–12)
POTASSIUM BLD-SCNC: 4 MMOL/L (ref 3.5–5.2)
RBC # BLD AUTO: 4.47 10*6/MM3 (ref 3.77–5.28)
RH BLD: POSITIVE
SODIUM BLD-SCNC: 144 MMOL/L (ref 136–145)
T&S EXPIRATION DATE: NORMAL
WBC NRBC COR # BLD: 5.71 10*3/MM3 (ref 3.4–10.8)

## 2019-07-25 PROCEDURE — 86850 RBC ANTIBODY SCREEN: CPT | Performed by: NURSE PRACTITIONER

## 2019-07-25 PROCEDURE — 36415 COLL VENOUS BLD VENIPUNCTURE: CPT

## 2019-07-25 PROCEDURE — 86900 BLOOD TYPING SEROLOGIC ABO: CPT

## 2019-07-25 PROCEDURE — 80048 BASIC METABOLIC PNL TOTAL CA: CPT | Performed by: NURSE PRACTITIONER

## 2019-07-25 PROCEDURE — 86901 BLOOD TYPING SEROLOGIC RH(D): CPT

## 2019-07-25 PROCEDURE — 86901 BLOOD TYPING SEROLOGIC RH(D): CPT | Performed by: NURSE PRACTITIONER

## 2019-07-25 PROCEDURE — 86900 BLOOD TYPING SEROLOGIC ABO: CPT | Performed by: NURSE PRACTITIONER

## 2019-07-25 PROCEDURE — 85025 COMPLETE CBC W/AUTO DIFF WBC: CPT | Performed by: NURSE PRACTITIONER

## 2019-07-25 RX ORDER — FERROUS SULFATE TAB EC 324 MG (65 MG FE EQUIVALENT) 324 (65 FE) MG
324 TABLET DELAYED RESPONSE ORAL
COMMUNITY

## 2019-07-25 RX ORDER — CHOLECALCIFEROL (VITAMIN D3) 125 MCG
1 CAPSULE ORAL DAILY
COMMUNITY

## 2019-07-25 RX ORDER — MULTIVIT-MIN/IRON/FOLIC ACID/K 18-600-40
CAPSULE ORAL
COMMUNITY
End: 2019-07-25

## 2019-07-25 RX ORDER — UBIDECARENONE 75 MG
100 CAPSULE ORAL DAILY
COMMUNITY

## 2019-07-25 RX ORDER — MULTIVITAMIN
1 TABLET ORAL DAILY
COMMUNITY

## 2019-07-25 RX ORDER — ASCORBIC ACID 500 MG
500 TABLET ORAL DAILY
COMMUNITY

## 2019-07-25 NOTE — DISCHARGE INSTRUCTIONS
TAKE the following medications the morning of surgery:  All heart or blood pressure medications    Please discontinue all blood thinners and anticoagulants (except aspirin) prior to surgery as per your surgeon and cardiologist instructions.  Aspirin may be continued up to the day prior to surgery.    HOLD all diabetic medications the morning of surgery as order by physician.    Please follow instructions on use of prep cloths provided by nurse. Return instruction sheet with stickers attached to pre-op nurse on day of surgery.    Arrival time for surgery on 7/30/2019 is 0800 am.    General Instructions:  • Do NOT eat or drink after midnight 7/29/2019 which includes water, mints, or gum.  • You may brush your teeth. Dental appliances that are removable must be taken out day of surgery.  • Do NOT smoke, chew tobacco, or drink alcohol within 24 hours prior to surgery.  • Bring medications in original bottles, any inhalers and if applicable your C-PAP/BI-PAP machine  • Bring any papers given to you in the doctor’s office  • Wear clean, comfortable clothes and socks  • Do NOT wear contact lenses or make-up or dark nail polish.  Bring a case for your glasses if applicable.  • Bring crutches or walker if applicable  • Leave all other valuables and jewelry at home  • If you were given a blood bank armband, continue to wear it until discharged.    Preventing a Surgical Site Infection:  • Shower the night before surgery (unless instructed otherwise) using a fresh bar of anti-bacterial soap (such as Dial) and clean washcloth.  Dry with a clean towel and dress in clean clothing.  • For 2 to 3 days before surgery, avoid shaving with a razor near where you will have surgery because the razor can irritate skin and make it easier to develop an infection.  Ask your surgeon if you will be receiving antibiotics prior to surgery.  • Make sure you, your family, and all healthcare providers clean their hands with soap and water or an  alcohol-based hand  before caring for you or your wound.  • If at all possible, quit smoking as many days before surgery as you can.    Day of Surgery:  Upon arrival, a pre-op nurse and anesthesiologist will review your health history, obtain vital signs, and answer questions you may have.  The only belongings needed at this time will be your home medications and if applicable you C-PAP/BI-PAP machine.  If you are staying overnight, your family can leave the rest of your belongings in the car and bring them to your room later.  A pre-op nurse will start an IV and you may receive medication in preparation for surgery.  Due to patient privacy and limited space, only one member of your family will be able to accompany you in the pre-op area.  While you are in surgery your family should notify the waiting room  if they leave the waiting room area and provide a contact number.  Please be aware that surgery does come with discomfort.  We want to make every effort to control your discomfort so please discuss any uncontrolled symptoms with your nurse.  Your doctor will most likely have prescribed pain medications.  If you are going home after surgery you will receive individualized written care instructions before being discharged.  A responsible adult must drive you to and from the hospital on the day of surgery and stay with you for 24 hours.  If you are staying overnight following surgery, you will be transported to your hospital room following the recovery period.

## 2019-07-26 ENCOUNTER — DOCUMENTATION (OUTPATIENT)
Dept: OBSTETRICS AND GYNECOLOGY | Facility: HOSPITAL | Age: 54
End: 2019-07-26

## 2019-07-30 ENCOUNTER — ANESTHESIA (OUTPATIENT)
Dept: PERIOP | Facility: HOSPITAL | Age: 54
End: 2019-07-30

## 2019-07-30 ENCOUNTER — APPOINTMENT (OUTPATIENT)
Dept: GENERAL RADIOLOGY | Facility: HOSPITAL | Age: 54
End: 2019-07-30

## 2019-07-30 ENCOUNTER — ANESTHESIA EVENT (OUTPATIENT)
Dept: PERIOP | Facility: HOSPITAL | Age: 54
End: 2019-07-30

## 2019-07-30 ENCOUNTER — HOSPITAL ENCOUNTER (OUTPATIENT)
Facility: HOSPITAL | Age: 54
Discharge: HOME | End: 2019-07-31
Attending: OBSTETRICS & GYNECOLOGY | Admitting: OBSTETRICS & GYNECOLOGY

## 2019-07-30 DIAGNOSIS — R10.2 PELVIC PAIN: ICD-10-CM

## 2019-07-30 DIAGNOSIS — N81.4 UTERINE PROLAPSE: ICD-10-CM

## 2019-07-30 DIAGNOSIS — R32 URINARY INCONTINENCE IN FEMALE: ICD-10-CM

## 2019-07-30 PROCEDURE — 25010000002 NEOSTIGMINE 10 MG/10ML SOLUTION: Performed by: NURSE ANESTHETIST, CERTIFIED REGISTERED

## 2019-07-30 PROCEDURE — 25010000002 MIDAZOLAM PER 1 MG: Performed by: NURSE ANESTHETIST, CERTIFIED REGISTERED

## 2019-07-30 PROCEDURE — 25010000002 PROPOFOL 10 MG/ML EMULSION: Performed by: NURSE ANESTHETIST, CERTIFIED REGISTERED

## 2019-07-30 PROCEDURE — 25010000002 DEXAMETHASONE PER 1 MG: Performed by: NURSE ANESTHETIST, CERTIFIED REGISTERED

## 2019-07-30 PROCEDURE — 25010000002 ONDANSETRON PER 1 MG: Performed by: NURSE ANESTHETIST, CERTIFIED REGISTERED

## 2019-07-30 PROCEDURE — 25010000002 HYDROMORPHONE PER 4 MG: Performed by: NURSE ANESTHETIST, CERTIFIED REGISTERED

## 2019-07-30 PROCEDURE — 25010000002 ONDANSETRON PER 1 MG: Performed by: OBSTETRICS & GYNECOLOGY

## 2019-07-30 PROCEDURE — C1771 REP DEV, URINARY, W/SLING: HCPCS | Performed by: OBSTETRICS & GYNECOLOGY

## 2019-07-30 PROCEDURE — G0378 HOSPITAL OBSERVATION PER HR: HCPCS

## 2019-07-30 PROCEDURE — 25010000002 CEFOXITIN: Performed by: NURSE PRACTITIONER

## 2019-07-30 PROCEDURE — 88307 TISSUE EXAM BY PATHOLOGIST: CPT | Performed by: OBSTETRICS & GYNECOLOGY

## 2019-07-30 PROCEDURE — 25010000002 PROMETHAZINE PER 50 MG: Performed by: ANESTHESIOLOGY

## 2019-07-30 PROCEDURE — 25010000002 FENTANYL CITRATE (PF) 100 MCG/2ML SOLUTION: Performed by: NURSE ANESTHETIST, CERTIFIED REGISTERED

## 2019-07-30 DEVICE — SLNG TVT EXACT CONTINENCE SYS BX/1EA: Type: IMPLANTABLE DEVICE | Status: FUNCTIONAL

## 2019-07-30 RX ORDER — PROMETHAZINE HYDROCHLORIDE 25 MG/ML
12.5 INJECTION, SOLUTION INTRAMUSCULAR; INTRAVENOUS ONCE
Status: DISCONTINUED | OUTPATIENT
Start: 2019-07-30 | End: 2019-07-30

## 2019-07-30 RX ORDER — NEOSTIGMINE METHYLSULFATE 1 MG/ML
INJECTION, SOLUTION INTRAVENOUS AS NEEDED
Status: DISCONTINUED | OUTPATIENT
Start: 2019-07-30 | End: 2019-07-30 | Stop reason: SURG

## 2019-07-30 RX ORDER — BUPIVACAINE HYDROCHLORIDE AND EPINEPHRINE 2.5; 5 MG/ML; UG/ML
INJECTION, SOLUTION EPIDURAL; INFILTRATION; INTRACAUDAL; PERINEURAL AS NEEDED
Status: DISCONTINUED | OUTPATIENT
Start: 2019-07-30 | End: 2019-07-30 | Stop reason: HOSPADM

## 2019-07-30 RX ORDER — FENTANYL CITRATE 50 UG/ML
INJECTION, SOLUTION INTRAMUSCULAR; INTRAVENOUS AS NEEDED
Status: DISCONTINUED | OUTPATIENT
Start: 2019-07-30 | End: 2019-07-30 | Stop reason: SURG

## 2019-07-30 RX ORDER — MIDAZOLAM HYDROCHLORIDE 1 MG/ML
INJECTION INTRAMUSCULAR; INTRAVENOUS AS NEEDED
Status: DISCONTINUED | OUTPATIENT
Start: 2019-07-30 | End: 2019-07-30 | Stop reason: SURG

## 2019-07-30 RX ORDER — FAMOTIDINE 10 MG/ML
INJECTION, SOLUTION INTRAVENOUS AS NEEDED
Status: DISCONTINUED | OUTPATIENT
Start: 2019-07-30 | End: 2019-07-30 | Stop reason: SURG

## 2019-07-30 RX ORDER — SCOLOPAMINE TRANSDERMAL SYSTEM 1 MG/1
PATCH, EXTENDED RELEASE TRANSDERMAL AS NEEDED
Status: DISCONTINUED | OUTPATIENT
Start: 2019-07-30 | End: 2019-07-30 | Stop reason: SURG

## 2019-07-30 RX ORDER — FENTANYL CITRATE 50 UG/ML
50 INJECTION, SOLUTION INTRAMUSCULAR; INTRAVENOUS
Status: DISCONTINUED | OUTPATIENT
Start: 2019-07-30 | End: 2019-07-30 | Stop reason: HOSPADM

## 2019-07-30 RX ORDER — MEPERIDINE HYDROCHLORIDE 25 MG/ML
12.5 INJECTION INTRAMUSCULAR; INTRAVENOUS; SUBCUTANEOUS
Status: DISCONTINUED | OUTPATIENT
Start: 2019-07-30 | End: 2019-07-30 | Stop reason: HOSPADM

## 2019-07-30 RX ORDER — NALOXONE HCL 0.4 MG/ML
0.1 VIAL (ML) INJECTION
Status: DISCONTINUED | OUTPATIENT
Start: 2019-07-30 | End: 2019-07-31 | Stop reason: HOSPADM

## 2019-07-30 RX ORDER — HYDROMORPHONE HCL 110MG/55ML
PATIENT CONTROLLED ANALGESIA SYRINGE INTRAVENOUS AS NEEDED
Status: DISCONTINUED | OUTPATIENT
Start: 2019-07-30 | End: 2019-07-30 | Stop reason: SURG

## 2019-07-30 RX ORDER — IBUPROFEN 800 MG/1
800 TABLET ORAL 3 TIMES DAILY PRN
Status: DISCONTINUED | OUTPATIENT
Start: 2019-07-30 | End: 2019-07-31 | Stop reason: HOSPADM

## 2019-07-30 RX ORDER — SODIUM CHLORIDE, SODIUM LACTATE, POTASSIUM CHLORIDE, CALCIUM CHLORIDE 600; 310; 30; 20 MG/100ML; MG/100ML; MG/100ML; MG/100ML
100 INJECTION, SOLUTION INTRAVENOUS CONTINUOUS
Status: DISCONTINUED | OUTPATIENT
Start: 2019-07-30 | End: 2019-07-31 | Stop reason: HOSPADM

## 2019-07-30 RX ORDER — SODIUM CHLORIDE 0.9 % (FLUSH) 0.9 %
3 SYRINGE (ML) INJECTION EVERY 12 HOURS SCHEDULED
Status: DISCONTINUED | OUTPATIENT
Start: 2019-07-30 | End: 2019-07-30 | Stop reason: HOSPADM

## 2019-07-30 RX ORDER — LIDOCAINE HYDROCHLORIDE 20 MG/ML
INJECTION, SOLUTION INFILTRATION; PERINEURAL AS NEEDED
Status: DISCONTINUED | OUTPATIENT
Start: 2019-07-30 | End: 2019-07-30 | Stop reason: SURG

## 2019-07-30 RX ORDER — ROCURONIUM BROMIDE 10 MG/ML
INJECTION, SOLUTION INTRAVENOUS AS NEEDED
Status: DISCONTINUED | OUTPATIENT
Start: 2019-07-30 | End: 2019-07-30 | Stop reason: SURG

## 2019-07-30 RX ORDER — SIMETHICONE 80 MG
80 TABLET,CHEWABLE ORAL 4 TIMES DAILY PRN
Status: DISCONTINUED | OUTPATIENT
Start: 2019-07-30 | End: 2019-07-31 | Stop reason: HOSPADM

## 2019-07-30 RX ORDER — SODIUM CHLORIDE 0.9 % (FLUSH) 0.9 %
3-10 SYRINGE (ML) INJECTION AS NEEDED
Status: DISCONTINUED | OUTPATIENT
Start: 2019-07-30 | End: 2019-07-30 | Stop reason: HOSPADM

## 2019-07-30 RX ORDER — DOCUSATE SODIUM 100 MG/1
100 CAPSULE, LIQUID FILLED ORAL 2 TIMES DAILY PRN
Status: DISCONTINUED | OUTPATIENT
Start: 2019-07-30 | End: 2019-07-31 | Stop reason: HOSPADM

## 2019-07-30 RX ORDER — GLYCOPYRROLATE 0.2 MG/ML
INJECTION INTRAMUSCULAR; INTRAVENOUS AS NEEDED
Status: DISCONTINUED | OUTPATIENT
Start: 2019-07-30 | End: 2019-07-30 | Stop reason: SURG

## 2019-07-30 RX ORDER — SODIUM CHLORIDE 9 MG/ML
INJECTION, SOLUTION INTRAVENOUS AS NEEDED
Status: DISCONTINUED | OUTPATIENT
Start: 2019-07-30 | End: 2019-07-30 | Stop reason: HOSPADM

## 2019-07-30 RX ORDER — ONDANSETRON 2 MG/ML
INJECTION INTRAMUSCULAR; INTRAVENOUS AS NEEDED
Status: DISCONTINUED | OUTPATIENT
Start: 2019-07-30 | End: 2019-07-30 | Stop reason: SURG

## 2019-07-30 RX ORDER — OXYCODONE HYDROCHLORIDE AND ACETAMINOPHEN 5; 325 MG/1; MG/1
1 TABLET ORAL ONCE AS NEEDED
Status: DISCONTINUED | OUTPATIENT
Start: 2019-07-30 | End: 2019-07-30 | Stop reason: HOSPADM

## 2019-07-30 RX ORDER — DEXAMETHASONE SODIUM PHOSPHATE 4 MG/ML
INJECTION, SOLUTION INTRA-ARTICULAR; INTRALESIONAL; INTRAMUSCULAR; INTRAVENOUS; SOFT TISSUE AS NEEDED
Status: DISCONTINUED | OUTPATIENT
Start: 2019-07-30 | End: 2019-07-30 | Stop reason: SURG

## 2019-07-30 RX ORDER — ONDANSETRON 2 MG/ML
4 INJECTION INTRAMUSCULAR; INTRAVENOUS EVERY 6 HOURS PRN
Status: DISCONTINUED | OUTPATIENT
Start: 2019-07-30 | End: 2019-07-31 | Stop reason: HOSPADM

## 2019-07-30 RX ORDER — IPRATROPIUM BROMIDE AND ALBUTEROL SULFATE 2.5; .5 MG/3ML; MG/3ML
3 SOLUTION RESPIRATORY (INHALATION) ONCE AS NEEDED
Status: DISCONTINUED | OUTPATIENT
Start: 2019-07-30 | End: 2019-07-30 | Stop reason: HOSPADM

## 2019-07-30 RX ORDER — LIDOCAINE HYDROCHLORIDE AND EPINEPHRINE 10; 10 MG/ML; UG/ML
INJECTION, SOLUTION INFILTRATION; PERINEURAL AS NEEDED
Status: DISCONTINUED | OUTPATIENT
Start: 2019-07-30 | End: 2019-07-30 | Stop reason: HOSPADM

## 2019-07-30 RX ORDER — PROPOFOL 10 MG/ML
VIAL (ML) INTRAVENOUS AS NEEDED
Status: DISCONTINUED | OUTPATIENT
Start: 2019-07-30 | End: 2019-07-30 | Stop reason: SURG

## 2019-07-30 RX ORDER — KETOROLAC TROMETHAMINE 30 MG/ML
15 INJECTION, SOLUTION INTRAMUSCULAR; INTRAVENOUS EVERY 6 HOURS PRN
Status: DISCONTINUED | OUTPATIENT
Start: 2019-07-30 | End: 2019-07-31 | Stop reason: HOSPADM

## 2019-07-30 RX ORDER — ZOLPIDEM TARTRATE 5 MG/1
5 TABLET ORAL NIGHTLY PRN
Status: DISCONTINUED | OUTPATIENT
Start: 2019-07-30 | End: 2019-07-31 | Stop reason: HOSPADM

## 2019-07-30 RX ORDER — ONDANSETRON 2 MG/ML
4 INJECTION INTRAMUSCULAR; INTRAVENOUS AS NEEDED
Status: DISCONTINUED | OUTPATIENT
Start: 2019-07-30 | End: 2019-07-30 | Stop reason: HOSPADM

## 2019-07-30 RX ORDER — OXYCODONE HYDROCHLORIDE AND ACETAMINOPHEN 5; 325 MG/1; MG/1
1 TABLET ORAL EVERY 4 HOURS PRN
Status: DISCONTINUED | OUTPATIENT
Start: 2019-07-30 | End: 2019-07-31 | Stop reason: HOSPADM

## 2019-07-30 RX ORDER — SODIUM CHLORIDE, SODIUM LACTATE, POTASSIUM CHLORIDE, CALCIUM CHLORIDE 600; 310; 30; 20 MG/100ML; MG/100ML; MG/100ML; MG/100ML
125 INJECTION, SOLUTION INTRAVENOUS CONTINUOUS
Status: DISCONTINUED | OUTPATIENT
Start: 2019-07-30 | End: 2019-07-30 | Stop reason: SDUPTHER

## 2019-07-30 RX ORDER — ONDANSETRON 4 MG/1
4 TABLET, FILM COATED ORAL EVERY 6 HOURS PRN
Status: DISCONTINUED | OUTPATIENT
Start: 2019-07-30 | End: 2019-07-31 | Stop reason: HOSPADM

## 2019-07-30 RX ORDER — MAGNESIUM HYDROXIDE 1200 MG/15ML
LIQUID ORAL AS NEEDED
Status: DISCONTINUED | OUTPATIENT
Start: 2019-07-30 | End: 2019-07-30 | Stop reason: HOSPADM

## 2019-07-30 RX ADMIN — ROCURONIUM BROMIDE 40 MG: 10 INJECTION INTRAVENOUS at 10:54

## 2019-07-30 RX ADMIN — DEXAMETHASONE SODIUM PHOSPHATE 4 MG: 4 INJECTION, SOLUTION INTRAMUSCULAR; INTRAVENOUS at 10:57

## 2019-07-30 RX ADMIN — HYDROMORPHONE HYDROCHLORIDE 1 MG: 2 INJECTION, SOLUTION INTRAMUSCULAR; INTRAVENOUS; SUBCUTANEOUS at 11:00

## 2019-07-30 RX ADMIN — LIDOCAINE HYDROCHLORIDE 40 MG: 20 INJECTION, SOLUTION INFILTRATION; PERINEURAL at 10:49

## 2019-07-30 RX ADMIN — ONDANSETRON 4 MG: 2 INJECTION, SOLUTION INTRAMUSCULAR; INTRAVENOUS at 10:49

## 2019-07-30 RX ADMIN — SODIUM CHLORIDE, POTASSIUM CHLORIDE, SODIUM LACTATE AND CALCIUM CHLORIDE 125 ML/HR: 600; 310; 30; 20 INJECTION, SOLUTION INTRAVENOUS at 09:50

## 2019-07-30 RX ADMIN — NEOSTIGMINE METHYLSULFATE 3 MG: 1 INJECTION, SOLUTION INTRAVENOUS at 12:30

## 2019-07-30 RX ADMIN — PROPOFOL 150 MG: 10 INJECTION, EMULSION INTRAVENOUS at 10:54

## 2019-07-30 RX ADMIN — MIDAZOLAM HYDROCHLORIDE 2 MG: 1 INJECTION, SOLUTION INTRAMUSCULAR; INTRAVENOUS at 10:49

## 2019-07-30 RX ADMIN — HYDROMORPHONE HYDROCHLORIDE 1 MG: 2 INJECTION, SOLUTION INTRAMUSCULAR; INTRAVENOUS; SUBCUTANEOUS at 12:33

## 2019-07-30 RX ADMIN — SODIUM CHLORIDE, POTASSIUM CHLORIDE, SODIUM LACTATE AND CALCIUM CHLORIDE: 600; 310; 30; 20 INJECTION, SOLUTION INTRAVENOUS at 11:17

## 2019-07-30 RX ADMIN — GLYCOPYRROLATE 0.4 MG: 0.2 INJECTION, SOLUTION INTRAMUSCULAR; INTRAVENOUS at 12:30

## 2019-07-30 RX ADMIN — SODIUM CHLORIDE, POTASSIUM CHLORIDE, SODIUM LACTATE AND CALCIUM CHLORIDE: 600; 310; 30; 20 INJECTION, SOLUTION INTRAVENOUS at 12:05

## 2019-07-30 RX ADMIN — FENTANYL CITRATE 100 MCG: 50 INJECTION INTRAMUSCULAR; INTRAVENOUS at 10:49

## 2019-07-30 RX ADMIN — FLUORESCEIN SODIUM 1 ML: 100 INJECTION INTRAVENOUS at 12:10

## 2019-07-30 RX ADMIN — PROMETHAZINE HYDROCHLORIDE 12.5 MG: 25 INJECTION INTRAMUSCULAR; INTRAVENOUS at 13:21

## 2019-07-30 RX ADMIN — ONDANSETRON 4 MG: 2 INJECTION, SOLUTION INTRAMUSCULAR; INTRAVENOUS at 13:07

## 2019-07-30 RX ADMIN — SCOPALAMINE 1 PATCH: 1 PATCH, EXTENDED RELEASE TRANSDERMAL at 10:49

## 2019-07-30 RX ADMIN — ONDANSETRON 4 MG: 2 INJECTION, SOLUTION INTRAMUSCULAR; INTRAVENOUS at 22:01

## 2019-07-30 RX ADMIN — FAMOTIDINE 20 MG: 10 INJECTION, SOLUTION INTRAVENOUS at 10:49

## 2019-07-30 RX ADMIN — CEFOXITIN 2 G: 2 INJECTION, POWDER, FOR SOLUTION INTRAVENOUS at 10:57

## 2019-07-30 NOTE — ANESTHESIA PREPROCEDURE EVALUATION
Anesthesia Evaluation     Patient summary reviewed and Nursing notes reviewed   NPO Solid Status: > 8 hours  NPO Liquid Status: > 8 hours           Airway   Mallampati: II  TM distance: >3 FB  Neck ROM: full  no difficulty expected  Dental - normal exam     Pulmonary - negative pulmonary ROS   (+) decreased breath sounds,   Cardiovascular - negative cardio ROS    Rhythm: regular  Rate: normal        Neuro/Psych- negative ROS  GI/Hepatic/Renal/Endo - negative ROS     Musculoskeletal     (+) chronic pain,   Abdominal    Substance History - negative use     OB/GYN negative ob/gyn ROS         Other - negative ROS                       Anesthesia Plan    ASA 3     general     intravenous induction   Anesthetic plan, all risks, benefits, and alternatives have been provided, discussed and informed consent has been obtained with: patient.  Use of blood products discussed with patient .   Plan discussed with CRNA.

## 2019-07-30 NOTE — ANESTHESIA POSTPROCEDURE EVALUATION
Patient: Carine Franklin    Procedure Summary     Date:  07/30/19 Room / Location:  [facility] OR 04 /  [facility] OR    Anesthesia Start:  1049 Anesthesia Stop:  1244    Procedures:       ROBOTIC TOTAL LAPAROSCOPIC HYSTERECTOMY WITH BILATERAL SALPINGO OOPHORECTOMY (N/A Abdomen)      ANTERIOR VAGINAL REPAIR (N/A Vagina)      RETROPUBIC TENSION FREE VAGINAL TAPING, CYSTOSCOPY (N/A Vagina) Diagnosis:  (N81.11  R10.2)    Surgeon:  Juan Garg MD Provider:  Jorge Guillaume MD    Anesthesia Type:  general ASA Status:  3          Anesthesia Type: general  Last vitals  BP   (P) 104/45 (07/30/19 1430)   Temp   (P) 97.7 °F (36.5 °C) (07/30/19 1430)   Pulse   (!) (P) 47 (07/30/19 1430)   Resp   (P) 16 (07/30/19 1430)     SpO2   (P) 100 % (07/30/19 1430)     Post Anesthesia Care and Evaluation    Patient location during evaluation: PHASE II  Patient participation: complete - patient participated  Level of consciousness: awake and alert  Pain score: 1  Pain management: adequate  Airway patency: patent  Anesthetic complications: No anesthetic complications  PONV Status: controlled  Cardiovascular status: acceptable  Respiratory status: acceptable  Hydration status: acceptable

## 2019-07-31 VITALS
SYSTOLIC BLOOD PRESSURE: 102 MMHG | BODY MASS INDEX: 32.47 KG/M2 | HEIGHT: 66 IN | HEART RATE: 69 BPM | TEMPERATURE: 98.7 F | WEIGHT: 202 LBS | RESPIRATION RATE: 18 BRPM | OXYGEN SATURATION: 99 % | DIASTOLIC BLOOD PRESSURE: 48 MMHG

## 2019-07-31 LAB
BASOPHILS # BLD AUTO: 0.01 10*3/MM3 (ref 0–0.2)
BASOPHILS NFR BLD AUTO: 0.1 % (ref 0–1.5)
DEPRECATED RDW RBC AUTO: 38.7 FL (ref 37–54)
EOSINOPHIL # BLD AUTO: 0 10*3/MM3 (ref 0–0.4)
EOSINOPHIL NFR BLD AUTO: 0 % (ref 0.3–6.2)
ERYTHROCYTE [DISTWIDTH] IN BLOOD BY AUTOMATED COUNT: 11.6 % (ref 12.3–15.4)
HCT VFR BLD AUTO: 36.5 % (ref 34–46.6)
HGB BLD-MCNC: 12.2 G/DL (ref 12–15.9)
IMM GRANULOCYTES # BLD AUTO: 0.03 10*3/MM3 (ref 0–0.05)
IMM GRANULOCYTES NFR BLD AUTO: 0.2 % (ref 0–0.5)
LYMPHOCYTES # BLD AUTO: 1.9 10*3/MM3 (ref 0.7–3.1)
LYMPHOCYTES NFR BLD AUTO: 12.4 % (ref 19.6–45.3)
MCH RBC QN AUTO: 31.3 PG (ref 26.6–33)
MCHC RBC AUTO-ENTMCNC: 33.4 G/DL (ref 31.5–35.7)
MCV RBC AUTO: 93.6 FL (ref 79–97)
MONOCYTES # BLD AUTO: 1.04 10*3/MM3 (ref 0.1–0.9)
MONOCYTES NFR BLD AUTO: 6.8 % (ref 5–12)
NEUTROPHILS # BLD AUTO: 12.31 10*3/MM3 (ref 1.7–7)
NEUTROPHILS NFR BLD AUTO: 80.5 % (ref 42.7–76)
PLATELET # BLD AUTO: 205 10*3/MM3 (ref 140–450)
PMV BLD AUTO: 9.4 FL (ref 6–12)
RBC # BLD AUTO: 3.9 10*6/MM3 (ref 3.77–5.28)
WBC NRBC COR # BLD: 15.29 10*3/MM3 (ref 3.4–10.8)

## 2019-07-31 PROCEDURE — G0378 HOSPITAL OBSERVATION PER HR: HCPCS

## 2019-07-31 PROCEDURE — 25010000002 KETOROLAC TROMETHAMINE PER 15 MG: Performed by: OBSTETRICS & GYNECOLOGY

## 2019-07-31 PROCEDURE — 85025 COMPLETE CBC W/AUTO DIFF WBC: CPT | Performed by: OBSTETRICS & GYNECOLOGY

## 2019-07-31 RX ORDER — IBUPROFEN 600 MG/1
600 TABLET ORAL EVERY 6 HOURS PRN
Qty: 30 TABLET | Refills: 0 | Status: SHIPPED | OUTPATIENT
Start: 2019-07-31 | End: 2019-08-30

## 2019-07-31 RX ORDER — NITROFURANTOIN 25; 75 MG/1; MG/1
100 CAPSULE ORAL 2 TIMES DAILY
Qty: 14 CAPSULE | Refills: 0 | Status: SHIPPED | OUTPATIENT
Start: 2019-07-31 | End: 2019-08-07

## 2019-07-31 RX ORDER — ESTRADIOL 2 MG/1
2 TABLET ORAL DAILY
Qty: 100 TABLET | Refills: 3 | Status: SHIPPED | OUTPATIENT
Start: 2019-07-31

## 2019-07-31 RX ORDER — OXYCODONE HYDROCHLORIDE AND ACETAMINOPHEN 5; 325 MG/1; MG/1
1 TABLET ORAL EVERY 4 HOURS PRN
Qty: 30 TABLET | Refills: 0 | Status: SHIPPED | OUTPATIENT
Start: 2019-07-31

## 2019-07-31 RX ORDER — DOCUSATE SODIUM 100 MG/1
100 CAPSULE, LIQUID FILLED ORAL 2 TIMES DAILY
Qty: 60 CAPSULE | Refills: 0 | Status: SHIPPED | OUTPATIENT
Start: 2019-07-31 | End: 2019-08-30

## 2019-07-31 RX ADMIN — KETOROLAC TROMETHAMINE 15 MG: 30 INJECTION, SOLUTION INTRAMUSCULAR at 04:30

## 2019-08-01 LAB
LAB AP CASE REPORT: NORMAL
PATH REPORT.FINAL DX SPEC: NORMAL

## 2020-12-10 ENCOUNTER — APPOINTMENT (OUTPATIENT)
Dept: MAMMOGRAPHY | Facility: HOSPITAL | Age: 55
End: 2020-12-10

## 2021-03-18 ENCOUNTER — BULK ORDERING (OUTPATIENT)
Dept: CASE MANAGEMENT | Facility: OTHER | Age: 56
End: 2021-03-18

## 2021-03-18 DIAGNOSIS — Z23 IMMUNIZATION DUE: ICD-10-CM

## 2022-07-21 NOTE — PROGRESS NOTES
Discharge Planning Assessment   [facility]     Patient Name: Carine Franklin  MRN: [id number]  Today's Date: 2/24/2018    Admit Date: 2/23/2018          Discharge Needs Assessment     None            Discharge Plan       02/24/18 1100    Case Management/Social Work Plan    Plan Home     Patient/Family In Agreement With Plan yes    Additional Comments Spoke top pt regarding discharge plans She is independent with ADLS Denies any difficulty going to doctor or obtaining her medicaiton Discharge plans is to return home confirmed phone number         Discharge Placement     No information found        Expected Discharge Date and Time     Expected Discharge Date Expected Discharge Time    Feb 25, 2018               Demographic Summary       02/24/18 1015    Referral Information    Admission Type inpatient    Arrived From admitted as an inpatient;home or self-care    Referral Source admission list    Primary Care Physician Information    Name Mirtha ANTONY             Functional Status       02/24/18 1016    IADL    Medications independent    Meal Preparation independent    Housekeeping independent    Laundry independent    Shopping independent    Oral Care independent            Psychosocial     None            Abuse/Neglect     None            Legal     None            Substance Abuse     None            Patient Forms     None          Tabby Jin     Patient was discharge from the [facility]  Goals have been met

## 2022-12-15 ENCOUNTER — TRANSCRIBE ORDERS (OUTPATIENT)
Dept: ADMINISTRATIVE | Facility: HOSPITAL | Age: 57
End: 2022-12-15

## 2022-12-15 DIAGNOSIS — Z13.820 SPECIAL SCREENING FOR OSTEOPOROSIS: Primary | ICD-10-CM

## 2022-12-15 DIAGNOSIS — Z12.31 VISIT FOR SCREENING MAMMOGRAM: Primary | ICD-10-CM

## 2022-12-15 DIAGNOSIS — M81.0 SENILE OSTEOPOROSIS: ICD-10-CM

## 2023-02-06 ENCOUNTER — HOSPITAL ENCOUNTER (OUTPATIENT)
Dept: MAMMOGRAPHY | Facility: HOSPITAL | Age: 58
Discharge: HOME | End: 2023-02-06
Payer: COMMERCIAL

## 2023-02-06 ENCOUNTER — HOSPITAL ENCOUNTER (OUTPATIENT)
Dept: BONE DENSITY | Facility: HOSPITAL | Age: 58
Discharge: HOME | End: 2023-02-06
Payer: COMMERCIAL

## 2023-02-06 DIAGNOSIS — Z12.31 VISIT FOR SCREENING MAMMOGRAM: ICD-10-CM

## 2023-02-06 DIAGNOSIS — Z13.820 SPECIAL SCREENING FOR OSTEOPOROSIS: ICD-10-CM

## 2023-02-06 PROCEDURE — 77080 DXA BONE DENSITY AXIAL: CPT | Performed by: RADIOLOGY

## 2023-02-06 PROCEDURE — 77067 SCR MAMMO BI INCL CAD: CPT

## 2023-02-06 PROCEDURE — 77063 BREAST TOMOSYNTHESIS BI: CPT | Performed by: RADIOLOGY

## 2023-02-06 PROCEDURE — 77080 DXA BONE DENSITY AXIAL: CPT

## 2023-02-06 PROCEDURE — 77063 BREAST TOMOSYNTHESIS BI: CPT

## 2023-02-06 PROCEDURE — 77067 SCR MAMMO BI INCL CAD: CPT | Performed by: RADIOLOGY

## 2024-04-22 ENCOUNTER — HOSPITAL ENCOUNTER (OUTPATIENT)
Dept: MAMMOGRAPHY | Facility: HOSPITAL | Age: 59
Discharge: HOME | End: 2024-04-22
Admitting: PHYSICIAN ASSISTANT
Payer: COMMERCIAL

## 2024-04-22 DIAGNOSIS — Z12.31 VISIT FOR SCREENING MAMMOGRAM: ICD-10-CM

## 2024-04-22 PROCEDURE — 77067 SCR MAMMO BI INCL CAD: CPT

## 2024-04-22 PROCEDURE — 77063 BREAST TOMOSYNTHESIS BI: CPT

## 2024-04-22 PROCEDURE — 77063 BREAST TOMOSYNTHESIS BI: CPT | Performed by: RADIOLOGY

## 2024-04-22 PROCEDURE — 77067 SCR MAMMO BI INCL CAD: CPT | Performed by: RADIOLOGY

## 2025-03-21 ENCOUNTER — TRANSCRIBE ORDERS (OUTPATIENT)
Dept: ADMINISTRATIVE | Facility: HOSPITAL | Age: 60
End: 2025-03-21
Payer: COMMERCIAL

## 2025-03-21 DIAGNOSIS — Z12.31 VISIT FOR SCREENING MAMMOGRAM: Primary | ICD-10-CM

## 2025-03-21 DIAGNOSIS — Z13.820 ENCOUNTER FOR OSTEOPOROSIS SCREENING IN ASYMPTOMATIC POSTMENOPAUSAL PATIENT: ICD-10-CM

## 2025-03-21 DIAGNOSIS — Z78.0 ENCOUNTER FOR OSTEOPOROSIS SCREENING IN ASYMPTOMATIC POSTMENOPAUSAL PATIENT: ICD-10-CM

## (undated) DEVICE — Device: Brand: ENDOGATOR

## (undated) DEVICE — VIOLET BRAIDED (POLYGLACTIN 910), SYNTHETIC ABSORBABLE SUTURE: Brand: COATED VICRYL

## (undated) DEVICE — VCARE LARGE UTERINE MANIPULATOR: Brand: VCARE LARGE

## (undated) DEVICE — INTENDED FOR TISSUE SEPARATION, AND OTHER PROCEDURES THAT REQUIRE A SHARP SURGICAL BLADE TO PUNCTURE OR CUT.: Brand: BARD-PARKER ® STAINLESS STEEL BLADES

## (undated) DEVICE — DISPOSABLE MONOPOLAR ENDOSCOPIC CORD 10 FT. (3M): Brand: KIRWAN

## (undated) DEVICE — ENDOPATH XCEL BLADELESS TROCARS WITH STABILITY SLEEVES: Brand: ENDOPATH XCEL

## (undated) DEVICE — TUBING, SUCTION, 1/4" X 20', STRAIGHT: Brand: MEDLINE INDUSTRIES, INC.

## (undated) DEVICE — PAD GRND REM POLYHESIVE A/ DISP

## (undated) DEVICE — OBT BLADLES ENDOWRIST DAVINCI/S 8MM

## (undated) DEVICE — APPL COTN TP PLSTC 6IN STRL LF PK/2

## (undated) DEVICE — SUT SILK 0 SH 30IN K834H

## (undated) DEVICE — PK BARIATRIC 10

## (undated) DEVICE — 50" SINGLE PATIENT USE HOVERMATT: Brand: SINGLE PATIENT USE HOVERMATT

## (undated) DEVICE — SUT MONOCRYL PLS ANTIB UND 3/0  PS1 27IN

## (undated) DEVICE — Device

## (undated) DEVICE — SUT GUT CHRM 0 CT2 27IN 884H

## (undated) DEVICE — SYS CLS PORTSITE CT CLOSESURE 5AND10/12

## (undated) DEVICE — COR MAJOR LITHOTOMY: Brand: MEDLINE INDUSTRIES, INC.

## (undated) DEVICE — FLTR PLUMEPORT LAP W/CONN STRL

## (undated) DEVICE — PENCL E/S HNDSWCH PUSHBTN HOLSTR 10FT

## (undated) DEVICE — CYSTO/BLADDER IRRIGATION SET, REGULATING CLAMP

## (undated) DEVICE — 2, DISPOSABLE SUCTION/IRRIGATOR WITH DISPOSABLE TIP: Brand: STRYKEFLOW

## (undated) DEVICE — TOTAL TRAY, 16FR 10ML SIL FOLEY, URN: Brand: MEDLINE

## (undated) DEVICE — TIP COVER ACCESSORY

## (undated) DEVICE — MEDI-VAC YANKAUER SUCTION HANDLE W/BULBOUS TIP: Brand: CARDINAL HEALTH

## (undated) DEVICE — OCCL COLPO PNEUMO  STRL

## (undated) DEVICE — ANTIBACTERIAL VIOLET BRAIDED (POLYGLACTIN 910), SYNTHETIC ABSORBABLE SUTURE: Brand: COATED VICRYL

## (undated) DEVICE — TISSUE RETRIEVAL SYSTEM: Brand: INZII RETRIEVAL SYSTEM

## (undated) DEVICE — SUCTION CANISTER, 1500CC, RIGID: Brand: DEROYAL

## (undated) DEVICE — GLV SURG SENSICARE MICRO PF LF 6.5 STRL

## (undated) DEVICE — 40595 XL TRENDELENBURG POSITIONING KIT: Brand: 40595 XL TRENDELENBURG POSITIONING KIT

## (undated) DEVICE — CONN TBG Y 5 IN 1 LF STRL

## (undated) DEVICE — ENDOGATOR HYBRID TUBING KIT FOR USE WITH ENDOGATOR IRRIGATION PUMP, OLYMPUS PUMP, GI4000 ESU, AND TORRENT IRRIGATION PUMP.: Brand: ENDOGATOR KIT

## (undated) DEVICE — UNDYED BRAIDED (POLYGLACTIN 910), SYNTHETIC ABSORBABLE SUTURE: Brand: COATED VICRYL

## (undated) DEVICE — ENDOPATH XCEL UNIVERSAL TROCAR STABLILITY SLEEVES: Brand: ENDOPATH XCEL

## (undated) DEVICE — APPL CHLORAPREP W/TINT 26ML ORNG

## (undated) DEVICE — SKIN AFFIX SURG ADHESIVE 72/CS 0.55ML: Brand: MEDLINE

## (undated) DEVICE — EVAC SMOKE LAP PLUMEAWAY 4L

## (undated) DEVICE — SEAL CANN CAM ENDOWRIST DAVINCI/S 8.5MM

## (undated) DEVICE — GLV SURG DERMASSURE GRN LF PF 7.0

## (undated) DEVICE — PK DAVINCI 70

## (undated) DEVICE — ECHELON FLEX POWERED PLUS LONG ARTICULATING ENDOSCOPIC LINEAR CUTTER, 60MM: Brand: ECHELON FLEX

## (undated) DEVICE — CVR HNDL LIGHT RIGID

## (undated) DEVICE — DRAPE,UTILTY,TAPE,15X26, 4EA/PK: Brand: MEDLINE

## (undated) DEVICE — GLV SURG PREMIERPRO MIC LTX PF SZ7.5 BRN

## (undated) DEVICE — 3M™ STERI-STRIP™ REINFORCED ADHESIVE SKIN CLOSURES, R1547, 1/2 IN X 4 IN (12 MM X 100 MM), 6 STRIPS/ENVELOPE: Brand: 3M™ STERI-STRIP™

## (undated) DEVICE — APL DUPLOSPRAYER MIS 40CM

## (undated) DEVICE — BANDAGE,GAUZE,BULKEE II,4.5"X4.1YD,STRL: Brand: MEDLINE

## (undated) DEVICE — LAPAROSCOPIC SCOPE WARMER: Brand: DEROYAL

## (undated) DEVICE — CANNULA SEAL

## (undated) DEVICE — SUT GUT CHRM 2/0 CT1 36IN 923H

## (undated) DEVICE — TROCAR: Brand: KII FIOS FIRST ENTRY

## (undated) DEVICE — [HIGH FLOW INSUFFLATOR,  DO NOT USE IF PACKAGE IS DAMAGED,  KEEP DRY,  KEEP AWAY FROM SUNLIGHT,  PROTECT FROM HEAT AND RADIOACTIVE SOURCES.]: Brand: PNEUMOSURE

## (undated) DEVICE — ANTIBACTERIAL VIOLET BRAIDED (POLYGLACTIN 910), SYNTHETIC ABSORBABLE SURGICAL SUTURE: Brand: COATED VICRYL